# Patient Record
Sex: FEMALE | Race: WHITE | NOT HISPANIC OR LATINO
[De-identification: names, ages, dates, MRNs, and addresses within clinical notes are randomized per-mention and may not be internally consistent; named-entity substitution may affect disease eponyms.]

---

## 2017-05-09 ENCOUNTER — APPOINTMENT (OUTPATIENT)
Dept: ELECTROPHYSIOLOGY | Facility: CLINIC | Age: 67
End: 2017-05-09

## 2017-05-09 VITALS
BODY MASS INDEX: 17.93 KG/M2 | HEIGHT: 64 IN | HEART RATE: 60 BPM | DIASTOLIC BLOOD PRESSURE: 82 MMHG | WEIGHT: 105 LBS | SYSTOLIC BLOOD PRESSURE: 140 MMHG

## 2017-10-13 ENCOUNTER — RX RENEWAL (OUTPATIENT)
Age: 67
End: 2017-10-13

## 2017-12-13 ENCOUNTER — APPOINTMENT (OUTPATIENT)
Dept: ELECTROPHYSIOLOGY | Facility: CLINIC | Age: 67
End: 2017-12-13
Payer: SELF-PAY

## 2017-12-13 VITALS
SYSTOLIC BLOOD PRESSURE: 152 MMHG | DIASTOLIC BLOOD PRESSURE: 88 MMHG | HEIGHT: 64 IN | HEART RATE: 63 BPM | WEIGHT: 105 LBS | BODY MASS INDEX: 17.93 KG/M2

## 2017-12-13 PROCEDURE — 93283 PRGRMG EVAL IMPLANTABLE DFB: CPT

## 2018-05-31 ENCOUNTER — APPOINTMENT (OUTPATIENT)
Dept: ELECTROPHYSIOLOGY | Facility: CLINIC | Age: 68
End: 2018-05-31
Payer: MEDICARE

## 2018-05-31 VITALS
DIASTOLIC BLOOD PRESSURE: 82 MMHG | SYSTOLIC BLOOD PRESSURE: 147 MMHG | BODY MASS INDEX: 17.93 KG/M2 | HEART RATE: 63 BPM | HEIGHT: 64 IN | WEIGHT: 105 LBS

## 2018-05-31 PROCEDURE — 93283 PRGRMG EVAL IMPLANTABLE DFB: CPT

## 2018-06-06 ENCOUNTER — OUTPATIENT (OUTPATIENT)
Dept: OUTPATIENT SERVICES | Facility: HOSPITAL | Age: 68
LOS: 1 days | Discharge: HOME | End: 2018-06-06
Payer: MEDICARE

## 2018-06-06 DIAGNOSIS — Z95.810 PRESENCE OF AUTOMATIC (IMPLANTABLE) CARDIAC DEFIBRILLATOR: Chronic | ICD-10-CM

## 2018-06-06 DIAGNOSIS — I42.1 OBSTRUCTIVE HYPERTROPHIC CARDIOMYOPATHY: ICD-10-CM

## 2018-06-06 DIAGNOSIS — I44.2 ATRIOVENTRICULAR BLOCK, COMPLETE: ICD-10-CM

## 2018-06-06 PROCEDURE — 93306 TTE W/DOPPLER COMPLETE: CPT | Mod: 26

## 2018-07-11 ENCOUNTER — APPOINTMENT (OUTPATIENT)
Dept: ELECTROPHYSIOLOGY | Facility: CLINIC | Age: 68
End: 2018-07-11
Payer: MEDICARE

## 2018-07-11 VITALS
HEART RATE: 72 BPM | SYSTOLIC BLOOD PRESSURE: 160 MMHG | HEIGHT: 64 IN | DIASTOLIC BLOOD PRESSURE: 87 MMHG | WEIGHT: 105 LBS | BODY MASS INDEX: 17.93 KG/M2

## 2018-07-11 PROCEDURE — 93283 PRGRMG EVAL IMPLANTABLE DFB: CPT

## 2018-07-11 PROCEDURE — 99215 OFFICE O/P EST HI 40 MIN: CPT

## 2018-07-16 ENCOUNTER — EMERGENCY (EMERGENCY)
Facility: HOSPITAL | Age: 68
LOS: 0 days | Discharge: HOME | End: 2018-07-16
Attending: EMERGENCY MEDICINE | Admitting: EMERGENCY MEDICINE
Payer: MEDICARE

## 2018-07-16 VITALS
DIASTOLIC BLOOD PRESSURE: 75 MMHG | OXYGEN SATURATION: 100 % | TEMPERATURE: 98 F | HEART RATE: 69 BPM | RESPIRATION RATE: 17 BRPM | SYSTOLIC BLOOD PRESSURE: 154 MMHG

## 2018-07-16 VITALS — WEIGHT: 115.08 LBS | HEIGHT: 64 IN

## 2018-07-16 DIAGNOSIS — Z95.810 PRESENCE OF AUTOMATIC (IMPLANTABLE) CARDIAC DEFIBRILLATOR: Chronic | ICD-10-CM

## 2018-07-16 DIAGNOSIS — K43.9 VENTRAL HERNIA WITHOUT OBSTRUCTION OR GANGRENE: ICD-10-CM

## 2018-07-16 LAB
ALBUMIN SERPL ELPH-MCNC: 4.1 G/DL — SIGNIFICANT CHANGE UP (ref 3.3–5)
ALP SERPL-CCNC: 118 U/L — SIGNIFICANT CHANGE UP (ref 40–120)
ALT FLD-CCNC: 30 U/L — SIGNIFICANT CHANGE UP (ref 12–78)
ANION GAP SERPL CALC-SCNC: 4 MMOL/L — LOW (ref 5–17)
AST SERPL-CCNC: 20 U/L — SIGNIFICANT CHANGE UP (ref 15–37)
BASOPHILS # BLD AUTO: 0.02 K/UL — SIGNIFICANT CHANGE UP (ref 0–0.2)
BASOPHILS NFR BLD AUTO: 0.4 % — SIGNIFICANT CHANGE UP (ref 0–2)
BILIRUB SERPL-MCNC: 0.5 MG/DL — SIGNIFICANT CHANGE UP (ref 0.2–1.2)
BUN SERPL-MCNC: 14 MG/DL — SIGNIFICANT CHANGE UP (ref 7–23)
CALCIUM SERPL-MCNC: 9.4 MG/DL — SIGNIFICANT CHANGE UP (ref 8.5–10.1)
CHLORIDE SERPL-SCNC: 104 MMOL/L — SIGNIFICANT CHANGE UP (ref 96–108)
CO2 SERPL-SCNC: 30 MMOL/L — SIGNIFICANT CHANGE UP (ref 22–31)
CREAT SERPL-MCNC: 0.91 MG/DL — SIGNIFICANT CHANGE UP (ref 0.5–1.3)
EOSINOPHIL # BLD AUTO: 0.07 K/UL — SIGNIFICANT CHANGE UP (ref 0–0.5)
EOSINOPHIL NFR BLD AUTO: 1.3 % — SIGNIFICANT CHANGE UP (ref 0–6)
GLUCOSE SERPL-MCNC: 96 MG/DL — SIGNIFICANT CHANGE UP (ref 70–99)
HCT VFR BLD CALC: 44.7 % — SIGNIFICANT CHANGE UP (ref 34.5–45)
HGB BLD-MCNC: 14.8 G/DL — SIGNIFICANT CHANGE UP (ref 11.5–15.5)
IMM GRANULOCYTES NFR BLD AUTO: 0.2 % — SIGNIFICANT CHANGE UP (ref 0–1.5)
INR BLD: 1.08 RATIO — SIGNIFICANT CHANGE UP (ref 0.88–1.16)
LACTATE SERPL-SCNC: 1.5 MMOL/L — SIGNIFICANT CHANGE UP (ref 0.7–2)
LYMPHOCYTES # BLD AUTO: 1.51 K/UL — SIGNIFICANT CHANGE UP (ref 1–3.3)
LYMPHOCYTES # BLD AUTO: 27.5 % — SIGNIFICANT CHANGE UP (ref 13–44)
MCHC RBC-ENTMCNC: 30.9 PG — SIGNIFICANT CHANGE UP (ref 27–34)
MCHC RBC-ENTMCNC: 33.1 GM/DL — SIGNIFICANT CHANGE UP (ref 32–36)
MCV RBC AUTO: 93.3 FL — SIGNIFICANT CHANGE UP (ref 80–100)
MONOCYTES # BLD AUTO: 0.7 K/UL — SIGNIFICANT CHANGE UP (ref 0–0.9)
MONOCYTES NFR BLD AUTO: 12.7 % — SIGNIFICANT CHANGE UP (ref 2–14)
NEUTROPHILS # BLD AUTO: 3.19 K/UL — SIGNIFICANT CHANGE UP (ref 1.8–7.4)
NEUTROPHILS NFR BLD AUTO: 57.9 % — SIGNIFICANT CHANGE UP (ref 43–77)
NRBC # BLD: 0 /100 WBCS — SIGNIFICANT CHANGE UP (ref 0–0)
PLATELET # BLD AUTO: 223 K/UL — SIGNIFICANT CHANGE UP (ref 150–400)
POTASSIUM SERPL-MCNC: 4.5 MMOL/L — SIGNIFICANT CHANGE UP (ref 3.5–5.3)
POTASSIUM SERPL-SCNC: 4.5 MMOL/L — SIGNIFICANT CHANGE UP (ref 3.5–5.3)
PROT SERPL-MCNC: 8 GM/DL — SIGNIFICANT CHANGE UP (ref 6–8.3)
PROTHROM AB SERPL-ACNC: 11.7 SEC — SIGNIFICANT CHANGE UP (ref 9.8–12.7)
RBC # BLD: 4.79 M/UL — SIGNIFICANT CHANGE UP (ref 3.8–5.2)
RBC # FLD: 12.9 % — SIGNIFICANT CHANGE UP (ref 10.3–14.5)
SODIUM SERPL-SCNC: 138 MMOL/L — SIGNIFICANT CHANGE UP (ref 135–145)
WBC # BLD: 5.5 K/UL — SIGNIFICANT CHANGE UP (ref 3.8–10.5)
WBC # FLD AUTO: 5.5 K/UL — SIGNIFICANT CHANGE UP (ref 3.8–10.5)

## 2018-07-16 PROCEDURE — 99284 EMERGENCY DEPT VISIT MOD MDM: CPT

## 2018-07-16 PROCEDURE — 74177 CT ABD & PELVIS W/CONTRAST: CPT | Mod: 26

## 2018-07-16 NOTE — ED STATDOCS - GASTROINTESTINAL, MLM
Abd non-distended. Bowel sounds present. Ventral hernia hard, non-reducible. No erythema. Mild tenderness to palpation.

## 2018-07-16 NOTE — CONSULT NOTE ADULT - ASSESSMENT
66 yo fem with epigastric small size hernia with preperitoneal fat inside hernia, no SBO  -She can go home and have hernia repair done electively if hernia continue bothering her. Patient needs to travel abroad in 3 [place], she said  -Minimize heavy lifting  -Case d/w PA

## 2018-07-16 NOTE — ED STATDOCS - PROGRESS NOTE DETAILS
68 yo F with no significant PMHx presents to ED c/o hernia.  Pt reports hernia popped out while exercising on Wednesday and has not gone back into place since but has gotten smaller, pt states hernia is only painful when pressed, denies N/V/D/C or other acute complaints at this time  PE: L sided ventral hernia mildly TTP, hard, non-reducible, w/o erythema, lungs CTA b/l, heart RRR s1/s2  Plan: CT a/p, labs, surgery consult  Grace Beckett PA-C spoke to surgeon on call Dr. Alarcon, states he is in the OR now and will see the pt when he is out of surgery  Grace Beckett PA-C CT A/P showed Fat-containing epigastric hernia within inflammatory changes.  No bowel obstruction. All labs WNL.  Pt seen by surgery Dr. Alarcon who advised pt can f/u as outpt to have hernia repaired electively.  Plan to d/c home with outpt f/u.  Pt agreeable to d/c and plan of care, all questions answered, return precautions  given.  Grace Beckett PA-C

## 2018-07-16 NOTE — CONSULT NOTE ADULT - SUBJECTIVE AND OBJECTIVE BOX
68 yo fem with h/o upper abdominal lump for many years, has been straining, lifting weight when she noticed upper abdominal lump got a bit bigger, she was able to reduce some of it, no vomiting. CT showed pre-peritoneal fat inside an epigastric hernia.    HPI:      PAST MEDICAL & SURGICAL HISTORY:  No previous surgeries, no PMH    REVIEW OF SYSTEMS:    CONSTITUTIONAL: No weakness, fevers or chills  EYES/ENT: No visual changes;  No vertigo or throat pain   NECK: No pain or stiffness  RESPIRATORY: No cough, wheezing, hemoptysis; No shortness of breath  CARDIOVASCULAR: No chest pain or palpitations  GASTROINTESTINAL: No abdominal or epigastric pain. No nausea, vomiting, or hematemesis; No diarrhea or constipation. No melena or hematochezia.  GENITOURINARY: No dysuria, frequency or hematuria  NEUROLOGICAL: No numbness or weakness  SKIN: No itching, burning, rashes, or lesions   All other review of systems is negative unless indicated above.    MEDICATIONS  (STANDING):    MEDICATIONS  (PRN):      Allergies    No Known Allergies    Intolerances        SOCIAL HISTORY: denies smoking    FAMILY HISTORY: non contributory      Vital Signs Last 24 Hrs  T(C): 36.5 (16 Jul 2018 11:45), Max: 36.5 (16 Jul 2018 11:45)  T(F): 97.7 (16 Jul 2018 11:45), Max: 97.7 (16 Jul 2018 11:45)  HR: 65 (16 Jul 2018 11:45) (65 - 65)  BP: 148/84 (16 Jul 2018 11:45) (148/84 - 148/84)  BP(mean): --  RR: 18 (16 Jul 2018 11:45) (18 - 18)  SpO2: 98% (16 Jul 2018 11:45) (98% - 98%)    .  VITAL SIGNS:  T(C): 36.5 (07-16-18 @ 11:45), Max: 36.5 (07-16-18 @ 11:45)  T(F): 97.7 (07-16-18 @ 11:45), Max: 97.7 (07-16-18 @ 11:45)  HR: 65 (07-16-18 @ 11:45) (65 - 65)  BP: 148/84 (07-16-18 @ 11:45) (148/84 - 148/84)  BP(mean): --  RR: 18 (07-16-18 @ 11:45) (18 - 18)  SpO2: 98% (07-16-18 @ 11:45) (98% - 98%)  Wt(kg): --    PHYSICAL EXAM:    Constitutional: resting comfortably in bed; NAD  Eyes: PERRL, EOMI, anicteric sclera  ENT: no nasal discharge; uvula midline, no oropharyngeal erythema or exudates  Neck: supple; no JVD or thyromegaly  Respiratory: CTA B/L; no W/R/R, no retractions  Cardiac: +S1/S2; RRR; no murmurs  Gastrointestinal: soft, minimally tender epigastric 2cm  hernia sac, rest of abdomen normal  Back: spine midline, no bony tenderness or step-offs; no CVAT B/L  Extremities: no clubbing or cyanosis; no peripheral edema  Musculoskeletal: NROM x4; no joint swelling, tenderness or erythema  Vascular: 2+ radial, femoral, DP/PT pulses B/L  Dermatologic: skin warm, dry and intact; no rashes, wounds, or scars  Lymphatic: no submandibular or cervical LAD  Neurologic: AAOx3; CNII-XII grossly intact; no focal deficits  Psychiatric: affect and characteristics of appearance, verbalizations, behaviors are appropriate    LABS:                        14.8   5.50  )-----------( 223      ( 16 Jul 2018 12:23 )             44.7       07-16    138  |  104  |  14  ----------------------------<  96  4.5   |  30  |  0.91    Ca    9.4      16 Jul 2018 12:23    TPro  8.0  /  Alb  4.1  /  TBili  0.5  /  DBili  x   /  AST  20  /  ALT  30  /  AlkPhos  118  07-16      PT/INR - ( 16 Jul 2018 12:23 )   PT: 11.7 sec;   INR: 1.08 ratio                 RADIOLOGY & ADDITIONAL STUDIES:

## 2018-07-16 NOTE — ED STATDOCS - OBJECTIVE STATEMENT
66 y/o F with PMHx of presenting to the ED c/o ventral hernia. Reports that she has a hernia which popped out five days ago while exercising. Notes that hernia has not gone back into place but has "gotten smaller" in size. No N/V/D. No other complaints.

## 2018-07-16 NOTE — ED ADULT NURSE NOTE - OBJECTIVE STATEMENT
Pt reports always having a small "bump" or "bulge" in her abd that did not appear to change. Pt reports that she was doing a new exercise where she raised a weight over her head and felt a pain in that area. Pt reports that the area is sensitive to touch and has some discomfort with similar motion of straining weight over her head.

## 2018-07-16 NOTE — ED ADULT TRIAGE NOTE - CHIEF COMPLAINT QUOTE
Sent in for CT of abdomen. Patient reports she has a "hernia", noted to be bigger since Wednesday after working out. Denies N/V/D, or constipation, reports "bulge" is getting smaller.

## 2018-07-16 NOTE — ED STATDOCS - MEDICAL DECISION MAKING DETAILS
. Ventral hernia reduced by Dr. Alarcon from surgery.  CT abdomen unremarkable.  Okay for d/c home, f/u with surgery as outpatient.

## 2018-08-23 ENCOUNTER — MEDICATION RENEWAL (OUTPATIENT)
Age: 68
End: 2018-08-23

## 2018-11-26 ENCOUNTER — RX RENEWAL (OUTPATIENT)
Age: 68
End: 2018-11-26

## 2018-12-04 NOTE — ED ADULT NURSE NOTE - NS ED PATIENT SAFETY CONCERN
Telephone Encounter by Mira Morrison at 05/01/18 02:30 PM     Author:  Mira Morrison Service:  (none) Author Type:  Patient      Filed:  05/01/18 02:32 PM Encounter Date:  4/26/2018 Status:  Signed     :  Mira Morrison (Patient )            Received signed release forms via fax.  They do not have address and phone for therapist and neurologist.  Left message for parents to call back with information since there are multiple listing on line and not sure which one to send to.[RR1.1M]        Revision History        User Key Date/Time User Provider Type Action    > RR1.1 05/01/18 02:32 PM Mira Morrison Patient  Sign    M - Manual             No

## 2019-02-15 ENCOUNTER — APPOINTMENT (OUTPATIENT)
Dept: ELECTROPHYSIOLOGY | Facility: CLINIC | Age: 69
End: 2019-02-15
Payer: MEDICARE

## 2019-02-15 VITALS — BODY MASS INDEX: 17.93 KG/M2 | HEIGHT: 64 IN | WEIGHT: 105 LBS

## 2019-02-15 VITALS
WEIGHT: 105 LBS | BODY MASS INDEX: 17.93 KG/M2 | HEART RATE: 73 BPM | HEIGHT: 64 IN | SYSTOLIC BLOOD PRESSURE: 158 MMHG | DIASTOLIC BLOOD PRESSURE: 97 MMHG

## 2019-02-15 PROCEDURE — 93283 PRGRMG EVAL IMPLANTABLE DFB: CPT

## 2019-02-15 PROCEDURE — 93290 INTERROG DEV EVAL ICPMS IP: CPT | Mod: 26

## 2019-02-28 ENCOUNTER — OUTPATIENT (OUTPATIENT)
Dept: OUTPATIENT SERVICES | Facility: HOSPITAL | Age: 69
LOS: 1 days | Discharge: HOME | End: 2019-02-28
Payer: MEDICARE

## 2019-02-28 VITALS
HEART RATE: 60 BPM | OXYGEN SATURATION: 100 % | HEIGHT: 64 IN | DIASTOLIC BLOOD PRESSURE: 74 MMHG | RESPIRATION RATE: 20 BRPM | WEIGHT: 110.01 LBS | TEMPERATURE: 98 F | SYSTOLIC BLOOD PRESSURE: 155 MMHG

## 2019-02-28 DIAGNOSIS — Z95.810 PRESENCE OF AUTOMATIC (IMPLANTABLE) CARDIAC DEFIBRILLATOR: Chronic | ICD-10-CM

## 2019-02-28 PROCEDURE — 99212 OFFICE O/P EST SF 10 MIN: CPT

## 2019-02-28 NOTE — H&P CARDIOLOGY - COMMENTS
Pt was successfully re-programmed as Hartman has advised.  Pt will f/u with Dr. Guerrero in his office

## 2019-03-08 DIAGNOSIS — I44.1 ATRIOVENTRICULAR BLOCK, SECOND DEGREE: ICD-10-CM

## 2019-05-31 ENCOUNTER — APPOINTMENT (OUTPATIENT)
Dept: ELECTROPHYSIOLOGY | Facility: CLINIC | Age: 69
End: 2019-05-31

## 2019-06-11 PROBLEM — I42.1 OBSTRUCTIVE HYPERTROPHIC CARDIOMYOPATHY: Chronic | Status: ACTIVE | Noted: 2019-02-28

## 2019-08-14 ENCOUNTER — APPOINTMENT (OUTPATIENT)
Dept: ELECTROPHYSIOLOGY | Facility: CLINIC | Age: 69
End: 2019-08-14
Payer: MEDICARE

## 2019-08-14 VITALS — HEART RATE: 67 BPM | DIASTOLIC BLOOD PRESSURE: 85 MMHG | SYSTOLIC BLOOD PRESSURE: 150 MMHG | HEIGHT: 64 IN

## 2019-08-14 PROCEDURE — 99213 OFFICE O/P EST LOW 20 MIN: CPT

## 2019-08-14 PROCEDURE — 93283 PRGRMG EVAL IMPLANTABLE DFB: CPT

## 2019-08-18 NOTE — DISCUSSION/SUMMARY
[AICD Function Normal] : normal AICD function [Paroxysmal Atrial Fibrillation] : paroxysmal atrial fibrillation [Hypertrophic Cardiomyopathy] : hypertrophic cardiomyopathy [Stable] : stable [None] : none [Patient] : the patient [de-identified] : continue ICD monitoring.

## 2019-08-18 NOTE — PROCEDURE
[NSR] : normal sinus rhythm [See Scanned Paceart Report] : See scanned paceart report [See Device Printout] : See device printout [ICD] : Implantable cardioverter-defibrillator [DDDR] : DDDR [Lead Imp:  ___ohms] : lead impedance was [unfilled] ohms [Sensing Amplitude ___mv] : sensing amplitude was [unfilled] mv [___V @] : [unfilled] V [___ ms] : [unfilled] ms [None] : none [Counters Reset] : the counters were reset [de-identified] : Fort[employer] DR 4808-72M [de-identified] : [employer] [de-identified] : 60/120

## 2019-08-18 NOTE — ASSESSMENT
[FreeTextEntry1] : This is a 67 year old woman with apical hypertrophic carediomyopathy moderate valvular heart disease whois physically active without significant symptoms. Interrogation of ICD demonstrates recent onset of brief (longest 12 sec)  paroxysmal atrial fibrillation.

## 2019-08-18 NOTE — REASON FOR VISIT
[Follow-up Device Check] : follow-up device check visit [Follow-Up - Clinic] : a clinic follow-up of [Atrial Fibrillation] : atrial fibrillation [Cardiomyopathy] : cardiomyopathy [FreeTextEntry1] : [facility]

## 2019-08-18 NOTE — PHYSICAL EXAM
[General Appearance - Well Developed] : well developed [Well Groomed] : well groomed [Normal Appearance] : normal appearance [No Deformities] : no deformities [General Appearance - Well Nourished] : well nourished [General Appearance - In No Acute Distress] : no acute distress [Respiration, Rhythm And Depth] : normal respiratory rhythm and effort [Exaggerated Use Of Accessory Muscles For Inspiration] : no accessory muscle use [Clean] : clean [Auscultation Breath Sounds / Voice Sounds] : lungs were clear to auscultation bilaterally [Dry] : dry [Well-Healed] : well-healed [Abdomen Tenderness] : non-tender [Abdomen Soft] : soft [Abdomen Mass (___ Cm)] : no abdominal mass palpated [Nail Clubbing] : no clubbing of the fingernails [Cyanosis, Localized] : no localized cyanosis [Petechial Hemorrhages (___cm)] : no petechial hemorrhages [Normal Conjunctiva] : the conjunctiva exhibited no abnormalities [Normal Oral Mucosa] : normal oral mucosa [Eyelids - No Xanthelasma] : the eyelids demonstrated no xanthelasmas [No Oral Cyanosis] : no oral cyanosis [No Oral Pallor] : no oral pallor [Normal Jugular Venous A Waves Present] : normal jugular venous A waves present [Normal Jugular Venous V Waves Present] : normal jugular venous V waves present [5th Left ICS - MCL] : palpated at the 5th LICS in the midclavicular line [No Jugular Venous Shaffer A Waves] : no jugular venous shaffer A waves [Normal Rate] : normal [Normal] : normal [Normal S1] : normal S1 [Rhythm Regular] : regular [Normal S2] : normal S2 [II] : a grade 2 [2+] : left 2+ [No Pitting Edema] : no pitting edema present [Rt] : no varicose veins of the right leg [Lt] : no varicose veins of the left leg [Gait - Sufficient For Exercise Testing] : the gait was sufficient for exercise testing [Abnormal Walk] : normal gait [Skin Color & Pigmentation] : normal skin color and pigmentation [] : no rash [No Venous Stasis] : no venous stasis [No Skin Ulcers] : no skin ulcer [No Xanthoma] : no  xanthoma was observed [Skin Lesions] : no skin lesions [Oriented To Time, Place, And Person] : oriented to person, place, and time [Affect] : the affect was normal [No Anxiety] : not feeling anxious [Mood] : the mood was normal

## 2019-08-18 NOTE — CARDIOLOGY SUMMARY
[___] : [unfilled] [LVEF ___%] : LVEF [unfilled]% [None] : normal LV function [Mild] : mild pulmonary hypertension [Enlarged] : enlarged LA size [Severe] : severe mitral regurgitation

## 2019-08-18 NOTE — HISTORY OF PRESENT ILLNESS
[FreeTextEntry1] : TIFFANIE FADI  denies chest pain, chest pressure, shortness of breath, lightheadedness, dizziness, palpitations, syncope, presyncope, orthopnea, PND, or edema.  [None] : The patient complains of no symptoms

## 2019-11-07 ENCOUNTER — APPOINTMENT (OUTPATIENT)
Dept: ELECTROPHYSIOLOGY | Facility: CLINIC | Age: 69
End: 2019-11-07

## 2019-11-07 RX ORDER — UBIDECARENONE 100 MG
100 CAPSULE ORAL
Refills: 0 | Status: ACTIVE | COMMUNITY

## 2020-02-04 ENCOUNTER — APPOINTMENT (OUTPATIENT)
Dept: ELECTROPHYSIOLOGY | Facility: CLINIC | Age: 70
End: 2020-02-04
Payer: MEDICARE

## 2020-02-04 VITALS
DIASTOLIC BLOOD PRESSURE: 91 MMHG | HEART RATE: 76 BPM | BODY MASS INDEX: 20.49 KG/M2 | SYSTOLIC BLOOD PRESSURE: 161 MMHG | HEIGHT: 64 IN | WEIGHT: 120 LBS | OXYGEN SATURATION: 97 %

## 2020-02-04 PROCEDURE — 93283 PRGRMG EVAL IMPLANTABLE DFB: CPT

## 2020-06-25 ENCOUNTER — APPOINTMENT (OUTPATIENT)
Dept: ELECTROPHYSIOLOGY | Facility: CLINIC | Age: 70
End: 2020-06-25
Payer: MEDICARE

## 2020-06-25 VITALS
BODY MASS INDEX: 20.49 KG/M2 | SYSTOLIC BLOOD PRESSURE: 156 MMHG | WEIGHT: 120 LBS | DIASTOLIC BLOOD PRESSURE: 95 MMHG | HEART RATE: 60 BPM | HEIGHT: 64 IN | OXYGEN SATURATION: 100 %

## 2020-06-25 PROCEDURE — 93283 PRGRMG EVAL IMPLANTABLE DFB: CPT

## 2020-07-13 ENCOUNTER — OUTPATIENT (OUTPATIENT)
Dept: OUTPATIENT SERVICES | Facility: HOSPITAL | Age: 70
LOS: 1 days | End: 2020-07-13

## 2020-07-13 ENCOUNTER — RESULT REVIEW (OUTPATIENT)
Age: 70
End: 2020-07-13

## 2020-07-13 ENCOUNTER — OUTPATIENT (OUTPATIENT)
Dept: OUTPATIENT SERVICES | Facility: HOSPITAL | Age: 70
LOS: 1 days | End: 2020-07-13
Payer: MEDICARE

## 2020-07-13 VITALS
DIASTOLIC BLOOD PRESSURE: 76 MMHG | SYSTOLIC BLOOD PRESSURE: 161 MMHG | WEIGHT: 119.05 LBS | HEIGHT: 64 IN | RESPIRATION RATE: 16 BRPM | HEART RATE: 60 BPM | TEMPERATURE: 98 F | OXYGEN SATURATION: 99 %

## 2020-07-13 DIAGNOSIS — I44.2 ATRIOVENTRICULAR BLOCK, COMPLETE: ICD-10-CM

## 2020-07-13 DIAGNOSIS — Z01.818 ENCOUNTER FOR OTHER PREPROCEDURAL EXAMINATION: ICD-10-CM

## 2020-07-13 DIAGNOSIS — Z95.810 PRESENCE OF AUTOMATIC (IMPLANTABLE) CARDIAC DEFIBRILLATOR: Chronic | ICD-10-CM

## 2020-07-13 LAB
ANION GAP SERPL CALC-SCNC: 3 MMOL/L — LOW (ref 5–17)
BASOPHILS # BLD AUTO: 0.01 K/UL — SIGNIFICANT CHANGE UP (ref 0–0.2)
BASOPHILS NFR BLD AUTO: 0.2 % — SIGNIFICANT CHANGE UP (ref 0–2)
BUN SERPL-MCNC: 13 MG/DL — SIGNIFICANT CHANGE UP (ref 7–23)
CALCIUM SERPL-MCNC: 9.4 MG/DL — SIGNIFICANT CHANGE UP (ref 8.5–10.1)
CHLORIDE SERPL-SCNC: 109 MMOL/L — HIGH (ref 96–108)
CO2 SERPL-SCNC: 30 MMOL/L — SIGNIFICANT CHANGE UP (ref 22–31)
CREAT SERPL-MCNC: 0.78 MG/DL — SIGNIFICANT CHANGE UP (ref 0.5–1.3)
EOSINOPHIL # BLD AUTO: 0.01 K/UL — SIGNIFICANT CHANGE UP (ref 0–0.5)
EOSINOPHIL NFR BLD AUTO: 0.2 % — SIGNIFICANT CHANGE UP (ref 0–6)
GLUCOSE SERPL-MCNC: 92 MG/DL — SIGNIFICANT CHANGE UP (ref 70–99)
HCT VFR BLD CALC: 42.1 % — SIGNIFICANT CHANGE UP (ref 34.5–45)
HGB BLD-MCNC: 14.1 G/DL — SIGNIFICANT CHANGE UP (ref 11.5–15.5)
IMM GRANULOCYTES NFR BLD AUTO: 0.2 % — SIGNIFICANT CHANGE UP (ref 0–1.5)
LYMPHOCYTES # BLD AUTO: 1.21 K/UL — SIGNIFICANT CHANGE UP (ref 1–3.3)
LYMPHOCYTES # BLD AUTO: 23 % — SIGNIFICANT CHANGE UP (ref 13–44)
MCHC RBC-ENTMCNC: 31.2 PG — SIGNIFICANT CHANGE UP (ref 27–34)
MCHC RBC-ENTMCNC: 33.5 GM/DL — SIGNIFICANT CHANGE UP (ref 32–36)
MCV RBC AUTO: 93.1 FL — SIGNIFICANT CHANGE UP (ref 80–100)
MONOCYTES # BLD AUTO: 0.51 K/UL — SIGNIFICANT CHANGE UP (ref 0–0.9)
MONOCYTES NFR BLD AUTO: 9.7 % — SIGNIFICANT CHANGE UP (ref 2–14)
NEUTROPHILS # BLD AUTO: 3.51 K/UL — SIGNIFICANT CHANGE UP (ref 1.8–7.4)
NEUTROPHILS NFR BLD AUTO: 66.7 % — SIGNIFICANT CHANGE UP (ref 43–77)
PLATELET # BLD AUTO: 187 K/UL — SIGNIFICANT CHANGE UP (ref 150–400)
POTASSIUM SERPL-MCNC: 4 MMOL/L — SIGNIFICANT CHANGE UP (ref 3.5–5.3)
POTASSIUM SERPL-SCNC: 4 MMOL/L — SIGNIFICANT CHANGE UP (ref 3.5–5.3)
RBC # BLD: 4.52 M/UL — SIGNIFICANT CHANGE UP (ref 3.8–5.2)
RBC # FLD: 12.9 % — SIGNIFICANT CHANGE UP (ref 10.3–14.5)
SODIUM SERPL-SCNC: 142 MMOL/L — SIGNIFICANT CHANGE UP (ref 135–145)
WBC # BLD: 5.26 K/UL — SIGNIFICANT CHANGE UP (ref 3.8–10.5)
WBC # FLD AUTO: 5.26 K/UL — SIGNIFICANT CHANGE UP (ref 3.8–10.5)

## 2020-07-13 PROCEDURE — U0003: CPT

## 2020-07-13 PROCEDURE — 71046 X-RAY EXAM CHEST 2 VIEWS: CPT

## 2020-07-13 PROCEDURE — 93005 ELECTROCARDIOGRAM TRACING: CPT

## 2020-07-13 PROCEDURE — 71046 X-RAY EXAM CHEST 2 VIEWS: CPT | Mod: 26

## 2020-07-13 PROCEDURE — G0463: CPT | Mod: 25

## 2020-07-13 PROCEDURE — 87641 MR-STAPH DNA AMP PROBE: CPT

## 2020-07-13 PROCEDURE — 93010 ELECTROCARDIOGRAM REPORT: CPT

## 2020-07-13 PROCEDURE — 93306 TTE W/DOPPLER COMPLETE: CPT | Mod: 26

## 2020-07-13 PROCEDURE — 80048 BASIC METABOLIC PNL TOTAL CA: CPT

## 2020-07-13 PROCEDURE — 36415 COLL VENOUS BLD VENIPUNCTURE: CPT

## 2020-07-13 PROCEDURE — 85025 COMPLETE CBC W/AUTO DIFF WBC: CPT

## 2020-07-13 PROCEDURE — 87640 STAPH A DNA AMP PROBE: CPT

## 2020-07-13 NOTE — H&P PST ADULT - HISTORY OF PRESENT ILLNESS
69 year old female with cardiomyopathy Pt ICD at end of life ; she presents to PST for planned ICD generator change

## 2020-07-13 NOTE — H&P PST ADULT - NSANTHOSAYNRD_GEN_A_CORE
No. AMARILYS screening performed.  STOP BANG Legend: 0-2 = LOW Risk; 3-4 = INTERMEDIATE Risk; 5-8 = HIGH Risk

## 2020-07-13 NOTE — H&P PST ADULT - ASSESSMENT
69 year old female she presents to PST for planned ICD generator change   1.  Dr Pena  office  will instruct patient regarding  medication regimen on day of procedure.  2. EPS  booking will call patient the day before surgery for arrival instructions   3. NPO post midnight  4. CBC BMP EKG MRSA CXR  as per Dr Pena   5. Patient instructed to have responsible adult accompany/ drive pt home after procedure

## 2020-07-13 NOTE — H&P PST ADULT - NSICDXPASTMEDICALHX_GEN_ALL_CORE_FT
PAST MEDICAL HISTORY:  Anxiety     Cardiomyopathy, hypertrophic obstructive     Complete heart block     Epigastric hernia     Pemphigus foliaceus     Pulmonary hypertension, mild ECHO 2018    Tricuspid regurgitation

## 2020-07-14 DIAGNOSIS — I44.2 ATRIOVENTRICULAR BLOCK, COMPLETE: ICD-10-CM

## 2020-07-14 DIAGNOSIS — Z01.818 ENCOUNTER FOR OTHER PREPROCEDURAL EXAMINATION: ICD-10-CM

## 2020-07-14 LAB
MRSA PCR RESULT.: SIGNIFICANT CHANGE UP
S AUREUS DNA NOSE QL NAA+PROBE: SIGNIFICANT CHANGE UP
SARS-COV-2 RNA SPEC QL NAA+PROBE: SIGNIFICANT CHANGE UP

## 2020-07-15 ENCOUNTER — OUTPATIENT (OUTPATIENT)
Dept: OUTPATIENT SERVICES | Facility: HOSPITAL | Age: 70
LOS: 1 days | Discharge: HOME | End: 2020-07-15
Payer: MEDICARE

## 2020-07-15 VITALS
HEIGHT: 64 IN | OXYGEN SATURATION: 100 % | HEART RATE: 60 BPM | SYSTOLIC BLOOD PRESSURE: 159 MMHG | WEIGHT: 115.96 LBS | RESPIRATION RATE: 18 BRPM | DIASTOLIC BLOOD PRESSURE: 95 MMHG | TEMPERATURE: 98 F

## 2020-07-15 DIAGNOSIS — I07.1 RHEUMATIC TRICUSPID INSUFFICIENCY: ICD-10-CM

## 2020-07-15 DIAGNOSIS — I42.1 OBSTRUCTIVE HYPERTROPHIC CARDIOMYOPATHY: ICD-10-CM

## 2020-07-15 DIAGNOSIS — I50.22 CHRONIC SYSTOLIC (CONGESTIVE) HEART FAILURE: ICD-10-CM

## 2020-07-15 DIAGNOSIS — I44.2 ATRIOVENTRICULAR BLOCK, COMPLETE: ICD-10-CM

## 2020-07-15 DIAGNOSIS — Z45.02 ENCOUNTER FOR ADJUSTMENT AND MANAGEMENT OF AUTOMATIC IMPLANTABLE CARDIAC DEFIBRILLATOR: ICD-10-CM

## 2020-07-15 DIAGNOSIS — Z95.810 PRESENCE OF AUTOMATIC (IMPLANTABLE) CARDIAC DEFIBRILLATOR: ICD-10-CM

## 2020-07-15 DIAGNOSIS — Z95.810 PRESENCE OF AUTOMATIC (IMPLANTABLE) CARDIAC DEFIBRILLATOR: Chronic | ICD-10-CM

## 2020-07-15 DIAGNOSIS — I27.20 PULMONARY HYPERTENSION, UNSPECIFIED: ICD-10-CM

## 2020-07-15 PROCEDURE — 93005 ELECTROCARDIOGRAM TRACING: CPT | Mod: XU

## 2020-07-15 PROCEDURE — 33263 RMVL & RPLCMT DFB GEN 2 LEAD: CPT

## 2020-07-15 PROCEDURE — C1721: CPT

## 2020-07-15 RX ORDER — CEPHALEXIN 500 MG
500 CAPSULE ORAL EVERY 6 HOURS
Refills: 0 | Status: DISCONTINUED | OUTPATIENT
Start: 2020-07-15 | End: 2020-07-15

## 2020-07-15 RX ORDER — CEFAZOLIN SODIUM 1 G
2000 VIAL (EA) INJECTION ONCE
Refills: 0 | Status: COMPLETED | OUTPATIENT
Start: 2020-07-15 | End: 2020-07-15

## 2020-07-15 RX ORDER — ASPIRIN/CALCIUM CARB/MAGNESIUM 324 MG
81 TABLET ORAL ONCE
Refills: 0 | Status: COMPLETED | OUTPATIENT
Start: 2020-07-15 | End: 2020-07-15

## 2020-07-15 RX ORDER — ATENOLOL 25 MG/1
25 TABLET ORAL ONCE
Refills: 0 | Status: COMPLETED | OUTPATIENT
Start: 2020-07-15 | End: 2020-07-15

## 2020-07-15 RX ORDER — ACETAMINOPHEN 500 MG
1000 TABLET ORAL ONCE
Refills: 0 | Status: COMPLETED | OUTPATIENT
Start: 2020-07-15 | End: 2020-07-15

## 2020-07-15 RX ORDER — CEFAZOLIN SODIUM 1 G
1000 VIAL (EA) INJECTION EVERY 8 HOURS
Refills: 0 | Status: COMPLETED | OUTPATIENT
Start: 2020-07-15 | End: 2020-07-16

## 2020-07-15 RX ADMIN — Medication 1000 MILLIGRAM(S): at 18:00

## 2020-07-15 RX ADMIN — Medication 400 MILLIGRAM(S): at 12:00

## 2020-07-15 RX ADMIN — Medication 100 MILLIGRAM(S): at 09:46

## 2020-07-15 NOTE — ASU PATIENT PROFILE, ADULT - PMH
Anxiety    Cardiomyopathy, hypertrophic obstructive    Complete heart block    Epigastric hernia    Pemphigus foliaceus    Pulmonary hypertension, mild  ECHO 2018  Tricuspid regurgitation

## 2020-07-15 NOTE — PACU DISCHARGE NOTE - COMMENTS
Report given to talon Kilpatrick RN on [facility].   Pt. placed on portable cardiac telemetry monitor and reading confirmed shirley Root [facility] telemetry monitor tech. Pt. transferred to inpt. room on [facility], 324-1, on cardiac monitor via stretcher accompanied by transport tech.

## 2020-07-16 ENCOUNTER — TRANSCRIPTION ENCOUNTER (OUTPATIENT)
Age: 70
End: 2020-07-16

## 2020-07-16 VITALS
DIASTOLIC BLOOD PRESSURE: 60 MMHG | TEMPERATURE: 98 F | OXYGEN SATURATION: 97 % | HEART RATE: 56 BPM | RESPIRATION RATE: 17 BRPM | SYSTOLIC BLOOD PRESSURE: 138 MMHG

## 2020-07-16 RX ADMIN — Medication 1000 MILLIGRAM(S): at 02:39

## 2020-07-16 RX ADMIN — Medication 1000 MILLIGRAM(S): at 00:09

## 2020-07-16 RX ADMIN — Medication 1000 MILLIGRAM(S): at 00:40

## 2020-07-16 NOTE — DISCHARGE NOTE PROVIDER - HOSPITAL COURSE
69yFemale female who presented for ICD gen change, post-operatively developed a hematoma.  Her wound is stable this am with No drainage, or erythema            TELE:  AS with  at 60 BPM        MEDICATIONS  (STANDING):        MEDICATIONS  (PRN):            Allergies        No Known Allergies        Intolerances        Vital Signs Last 24 Hrs    T(C): 36.7 (16 Jul 2020 05:42), Max: 36.7 (15 Jul 2020 12:25)    T(F): 98 (16 Jul 2020 05:42), Max: 98.1 (15 Jul 2020 12:25)    HR: 55 (16 Jul 2020 05:42) (55 - 63)    BP: 141/62 (16 Jul 2020 05:42) (133/82 - 159/95)    BP(mean): 92 (16 Jul 2020 00:19) (92 - 92)    RR: 16 (16 Jul 2020 00:19) (16 - 19)    SpO2: 97% (16 Jul 2020 05:42) (96% - 100%)        PHYSICAL EXAMINATION:        GENERAL APPEARANCE:  Pt. is not currently dyspneic, in no distress. Pt. is alert, oriented, and pleasant.    HEENT:  Pupils are normal and react normally. No icterus. Mucous membranes well colored.    NECK:  Supple. No lymphadenopathy. Jugular venous pressure not elevated. Carotids equal.     HEART:   The cardiac impulse has a normal quality. There are no murmurs, rubs or gallops noted    CHEST:  Chest is clear to auscultation. Normal respiratory effort, wound with minimal hematoma, no drainage    ABDOMEN:  Soft and nontender.     EXTREMITIES:  There is no edema.     SKIN:  No rash or significant lesions are noted.    Telelmetry:  AS with  and tracking at 60 BPM

## 2020-07-16 NOTE — DISCHARGE NOTE PROVIDER - NSDCCPCAREPLAN_GEN_ALL_CORE_FT
PRINCIPAL DISCHARGE DIAGNOSIS  Diagnosis: Heart failure, systolic, chronic, hypertrophic obstruct cardiomyopathy  Assessment and Plan of Treatment: Low Na diet  F/u with cardiology

## 2020-07-16 NOTE — DISCHARGE NOTE PROVIDER - CARE PROVIDERS DIRECT ADDRESSES
[email or web address]

## 2020-07-16 NOTE — DISCHARGE NOTE PROVIDER - NSDCMRMEDTOKEN_GEN_ALL_CORE_FT
atenolol 25 mg oral tablet: 12.5 milligram(s) orally once a day  CoQ10: 200 milligram(s) orally once a day  Low Dose ASA 81 mg oral tablet: 1 tab(s) orally once a day

## 2020-07-16 NOTE — DISCHARGE NOTE NURSING/CASE MANAGEMENT/SOCIAL WORK - PATIENT PORTAL LINK FT
You can access the FollowMyHealth Patient Portal offered by [facility] by registering at the following website: [email or web address]. By joining [employer]’s FollowMyHealth portal, you will also be able to view your health information using other applications (apps) compatible with our system.

## 2020-07-21 ENCOUNTER — APPOINTMENT (OUTPATIENT)
Dept: CARDIOLOGY | Facility: CLINIC | Age: 70
End: 2020-07-21

## 2020-07-30 ENCOUNTER — APPOINTMENT (OUTPATIENT)
Dept: ELECTROPHYSIOLOGY | Facility: CLINIC | Age: 70
End: 2020-07-30
Payer: MEDICARE

## 2020-07-30 VITALS
SYSTOLIC BLOOD PRESSURE: 152 MMHG | BODY MASS INDEX: 20.49 KG/M2 | WEIGHT: 120 LBS | DIASTOLIC BLOOD PRESSURE: 75 MMHG | HEIGHT: 64 IN | OXYGEN SATURATION: 98 %

## 2020-07-30 PROBLEM — I44.2 ATRIOVENTRICULAR BLOCK, COMPLETE: Chronic | Status: ACTIVE | Noted: 2020-07-13

## 2020-07-30 PROBLEM — F41.9 ANXIETY DISORDER, UNSPECIFIED: Chronic | Status: ACTIVE | Noted: 2020-07-13

## 2020-07-30 PROBLEM — L10.2 PEMPHIGUS FOLIACEOUS: Chronic | Status: ACTIVE | Noted: 2020-07-13

## 2020-07-30 PROBLEM — K43.9 VENTRAL HERNIA WITHOUT OBSTRUCTION OR GANGRENE: Chronic | Status: ACTIVE | Noted: 2020-07-13

## 2020-07-30 PROBLEM — I07.1 RHEUMATIC TRICUSPID INSUFFICIENCY: Chronic | Status: ACTIVE | Noted: 2020-07-13

## 2020-07-30 PROBLEM — I27.20 PULMONARY HYPERTENSION, UNSPECIFIED: Chronic | Status: ACTIVE | Noted: 2020-07-13

## 2020-07-30 PROCEDURE — 99024 POSTOP FOLLOW-UP VISIT: CPT

## 2020-08-06 RX ORDER — MULTIVITAMIN
TABLET ORAL
Refills: 0 | Status: ACTIVE | COMMUNITY

## 2020-08-06 RX ORDER — DIAPER,BRIEF,INFANT-TODD,DISP
50 EACH MISCELLANEOUS
Refills: 0 | Status: ACTIVE | COMMUNITY

## 2020-08-06 RX ORDER — ADHESIVE TAPE 3"X 2.3 YD
50 MCG TAPE, NON-MEDICATED TOPICAL
Refills: 0 | Status: ACTIVE | COMMUNITY
Start: 2020-08-06

## 2020-08-06 RX ORDER — CHOLECALCIFEROL (VITAMIN D3) 50 MCG
2000 CAPSULE ORAL
Refills: 0 | Status: DISCONTINUED | COMMUNITY
End: 2020-08-06

## 2020-08-11 ENCOUNTER — APPOINTMENT (OUTPATIENT)
Dept: ELECTROPHYSIOLOGY | Facility: CLINIC | Age: 70
End: 2020-08-11

## 2020-08-13 ENCOUNTER — APPOINTMENT (OUTPATIENT)
Dept: ELECTROPHYSIOLOGY | Facility: CLINIC | Age: 70
End: 2020-08-13

## 2020-08-20 ENCOUNTER — APPOINTMENT (OUTPATIENT)
Dept: ELECTROPHYSIOLOGY | Facility: CLINIC | Age: 70
End: 2020-08-20

## 2020-09-11 ENCOUNTER — APPOINTMENT (OUTPATIENT)
Dept: ELECTROPHYSIOLOGY | Facility: CLINIC | Age: 70
End: 2020-09-11
Payer: MEDICARE

## 2020-09-11 VITALS
HEART RATE: 64 BPM | WEIGHT: 116 LBS | BODY MASS INDEX: 19.81 KG/M2 | SYSTOLIC BLOOD PRESSURE: 168 MMHG | OXYGEN SATURATION: 100 % | HEIGHT: 64 IN | DIASTOLIC BLOOD PRESSURE: 97 MMHG

## 2020-09-11 PROCEDURE — 93290 INTERROG DEV EVAL ICPMS IP: CPT | Mod: 26

## 2020-09-11 PROCEDURE — 93283 PRGRMG EVAL IMPLANTABLE DFB: CPT

## 2020-09-25 ENCOUNTER — TRANSCRIPTION ENCOUNTER (OUTPATIENT)
Age: 70
End: 2020-09-25

## 2021-04-15 ENCOUNTER — NON-APPOINTMENT (OUTPATIENT)
Age: 71
End: 2021-04-15

## 2021-04-15 ENCOUNTER — APPOINTMENT (OUTPATIENT)
Dept: ELECTROPHYSIOLOGY | Facility: CLINIC | Age: 71
End: 2021-04-15
Payer: MEDICARE

## 2021-04-15 VITALS
OXYGEN SATURATION: 100 % | DIASTOLIC BLOOD PRESSURE: 79 MMHG | WEIGHT: 120 LBS | HEIGHT: 64 IN | SYSTOLIC BLOOD PRESSURE: 157 MMHG | BODY MASS INDEX: 20.49 KG/M2 | HEART RATE: 79 BPM

## 2021-04-15 DIAGNOSIS — I48.0 PAROXYSMAL ATRIAL FIBRILLATION: ICD-10-CM

## 2021-04-15 PROCEDURE — 93290 INTERROG DEV EVAL ICPMS IP: CPT | Mod: 26

## 2021-04-15 PROCEDURE — 93283 PRGRMG EVAL IMPLANTABLE DFB: CPT

## 2021-05-04 PROBLEM — I48.0 PAROXYSMAL ATRIAL FIBRILLATION: Status: ACTIVE | Noted: 2018-07-31

## 2021-10-19 ENCOUNTER — APPOINTMENT (OUTPATIENT)
Dept: ELECTROPHYSIOLOGY | Facility: CLINIC | Age: 71
End: 2021-10-19
Payer: MEDICARE

## 2021-10-19 VITALS
HEIGHT: 64 IN | HEART RATE: 88 BPM | DIASTOLIC BLOOD PRESSURE: 100 MMHG | WEIGHT: 125 LBS | RESPIRATION RATE: 14 BRPM | SYSTOLIC BLOOD PRESSURE: 169 MMHG | BODY MASS INDEX: 21.34 KG/M2 | OXYGEN SATURATION: 100 %

## 2021-10-19 PROCEDURE — 93283 PRGRMG EVAL IMPLANTABLE DFB: CPT

## 2021-10-19 PROCEDURE — 93290 INTERROG DEV EVAL ICPMS IP: CPT | Mod: 26

## 2021-11-04 NOTE — ASU PATIENT PROFILE, ADULT - NS TRANSFER PATIENT BELONGINGS
Problem: Adult Inpatient Plan of Care  Goal: Plan of Care Review  Outcome: Ongoing, Progressing  Flowsheets (Taken 11/4/2021 6505)  Progress: improving  Plan of Care Reviewed With: patient  Outcome Summary: VSS. Pt received 2x units of blood yesterday. Hgb now 9.5. Pt was choking on thin liquids/applesauce with meds- diet changed to NPO. Pt c/o of nausea- IV Zofran given per MAR. IVF infusing at 75 mls. Pt stable and needs met at this time.      credit cards x 1/Cell Phone/PDA (specify)/Clothing/Jewelry/Money (specify)

## 2022-02-15 ENCOUNTER — APPOINTMENT (OUTPATIENT)
Dept: ELECTROPHYSIOLOGY | Facility: CLINIC | Age: 72
End: 2022-02-15

## 2022-05-22 ENCOUNTER — EMERGENCY (EMERGENCY)
Facility: HOSPITAL | Age: 72
LOS: 0 days | Discharge: HOME | End: 2022-05-22
Attending: EMERGENCY MEDICINE
Payer: MEDICARE

## 2022-05-22 VITALS
TEMPERATURE: 98 F | RESPIRATION RATE: 16 BRPM | DIASTOLIC BLOOD PRESSURE: 70 MMHG | SYSTOLIC BLOOD PRESSURE: 158 MMHG | HEART RATE: 65 BPM | OXYGEN SATURATION: 95 %

## 2022-05-22 VITALS — WEIGHT: 149.91 LBS | HEIGHT: 64 IN

## 2022-05-22 DIAGNOSIS — I27.20 PULMONARY HYPERTENSION, UNSPECIFIED: ICD-10-CM

## 2022-05-22 DIAGNOSIS — R00.2 PALPITATIONS: ICD-10-CM

## 2022-05-22 DIAGNOSIS — I42.1 OBSTRUCTIVE HYPERTROPHIC CARDIOMYOPATHY: ICD-10-CM

## 2022-05-22 DIAGNOSIS — Z95.810 PRESENCE OF AUTOMATIC (IMPLANTABLE) CARDIAC DEFIBRILLATOR: Chronic | ICD-10-CM

## 2022-05-22 DIAGNOSIS — R42 DIZZINESS AND GIDDINESS: ICD-10-CM

## 2022-05-22 DIAGNOSIS — F41.9 ANXIETY DISORDER, UNSPECIFIED: ICD-10-CM

## 2022-05-22 LAB
ALBUMIN SERPL ELPH-MCNC: 3.9 G/DL — SIGNIFICANT CHANGE UP (ref 3.3–5)
ALP SERPL-CCNC: 127 U/L — HIGH (ref 40–120)
ALT FLD-CCNC: 39 U/L — SIGNIFICANT CHANGE UP (ref 12–78)
ANION GAP SERPL CALC-SCNC: 8 MMOL/L — SIGNIFICANT CHANGE UP (ref 5–17)
AST SERPL-CCNC: 32 U/L — SIGNIFICANT CHANGE UP (ref 15–37)
BASOPHILS # BLD AUTO: 0.02 K/UL — SIGNIFICANT CHANGE UP (ref 0–0.2)
BASOPHILS NFR BLD AUTO: 0.3 % — SIGNIFICANT CHANGE UP (ref 0–2)
BILIRUB SERPL-MCNC: 0.4 MG/DL — SIGNIFICANT CHANGE UP (ref 0.2–1.2)
BUN SERPL-MCNC: 17 MG/DL — SIGNIFICANT CHANGE UP (ref 7–23)
CALCIUM SERPL-MCNC: 9.4 MG/DL — SIGNIFICANT CHANGE UP (ref 8.5–10.1)
CHLORIDE SERPL-SCNC: 105 MMOL/L — SIGNIFICANT CHANGE UP (ref 96–108)
CO2 SERPL-SCNC: 26 MMOL/L — SIGNIFICANT CHANGE UP (ref 22–31)
CREAT SERPL-MCNC: 0.83 MG/DL — SIGNIFICANT CHANGE UP (ref 0.5–1.3)
EGFR: 76 ML/MIN/1.73M2 — SIGNIFICANT CHANGE UP
EOSINOPHIL # BLD AUTO: 0.04 K/UL — SIGNIFICANT CHANGE UP (ref 0–0.5)
EOSINOPHIL NFR BLD AUTO: 0.6 % — SIGNIFICANT CHANGE UP (ref 0–6)
GLUCOSE SERPL-MCNC: 116 MG/DL — HIGH (ref 70–99)
HCT VFR BLD CALC: 41.2 % — SIGNIFICANT CHANGE UP (ref 34.5–45)
HGB BLD-MCNC: 13.9 G/DL — SIGNIFICANT CHANGE UP (ref 11.5–15.5)
IMM GRANULOCYTES NFR BLD AUTO: 0.1 % — SIGNIFICANT CHANGE UP (ref 0–1.5)
LYMPHOCYTES # BLD AUTO: 1.97 K/UL — SIGNIFICANT CHANGE UP (ref 1–3.3)
LYMPHOCYTES # BLD AUTO: 28.6 % — SIGNIFICANT CHANGE UP (ref 13–44)
MAGNESIUM SERPL-MCNC: 2.1 MG/DL — SIGNIFICANT CHANGE UP (ref 1.6–2.6)
MCHC RBC-ENTMCNC: 31 PG — SIGNIFICANT CHANGE UP (ref 27–34)
MCHC RBC-ENTMCNC: 33.7 GM/DL — SIGNIFICANT CHANGE UP (ref 32–36)
MCV RBC AUTO: 92 FL — SIGNIFICANT CHANGE UP (ref 80–100)
MONOCYTES # BLD AUTO: 0.77 K/UL — SIGNIFICANT CHANGE UP (ref 0–0.9)
MONOCYTES NFR BLD AUTO: 11.2 % — SIGNIFICANT CHANGE UP (ref 2–14)
NEUTROPHILS # BLD AUTO: 4.08 K/UL — SIGNIFICANT CHANGE UP (ref 1.8–7.4)
NEUTROPHILS NFR BLD AUTO: 59.2 % — SIGNIFICANT CHANGE UP (ref 43–77)
PHOSPHATE SERPL-MCNC: 3 MG/DL — SIGNIFICANT CHANGE UP (ref 2.5–4.5)
PLATELET # BLD AUTO: 195 K/UL — SIGNIFICANT CHANGE UP (ref 150–400)
POTASSIUM SERPL-MCNC: 4.1 MMOL/L — SIGNIFICANT CHANGE UP (ref 3.5–5.3)
POTASSIUM SERPL-SCNC: 4.1 MMOL/L — SIGNIFICANT CHANGE UP (ref 3.5–5.3)
PROT SERPL-MCNC: 7.2 GM/DL — SIGNIFICANT CHANGE UP (ref 6–8.3)
RBC # BLD: 4.48 M/UL — SIGNIFICANT CHANGE UP (ref 3.8–5.2)
RBC # FLD: 13.3 % — SIGNIFICANT CHANGE UP (ref 10.3–14.5)
SODIUM SERPL-SCNC: 139 MMOL/L — SIGNIFICANT CHANGE UP (ref 135–145)
TROPONIN I, HIGH SENSITIVITY RESULT: 22.94 NG/L — SIGNIFICANT CHANGE UP
WBC # BLD: 6.89 K/UL — SIGNIFICANT CHANGE UP (ref 3.8–10.5)
WBC # FLD AUTO: 6.89 K/UL — SIGNIFICANT CHANGE UP (ref 3.8–10.5)

## 2022-05-22 PROCEDURE — 83735 ASSAY OF MAGNESIUM: CPT

## 2022-05-22 PROCEDURE — 36415 COLL VENOUS BLD VENIPUNCTURE: CPT

## 2022-05-22 PROCEDURE — 84100 ASSAY OF PHOSPHORUS: CPT

## 2022-05-22 PROCEDURE — 93005 ELECTROCARDIOGRAM TRACING: CPT

## 2022-05-22 PROCEDURE — 71045 X-RAY EXAM CHEST 1 VIEW: CPT | Mod: 26

## 2022-05-22 PROCEDURE — 71045 X-RAY EXAM CHEST 1 VIEW: CPT

## 2022-05-22 PROCEDURE — 93010 ELECTROCARDIOGRAM REPORT: CPT

## 2022-05-22 PROCEDURE — 99285 EMERGENCY DEPT VISIT HI MDM: CPT | Mod: 25

## 2022-05-22 PROCEDURE — 84484 ASSAY OF TROPONIN QUANT: CPT | Mod: 91

## 2022-05-22 PROCEDURE — 85025 COMPLETE CBC W/AUTO DIFF WBC: CPT

## 2022-05-22 PROCEDURE — 80053 COMPREHEN METABOLIC PANEL: CPT

## 2022-05-22 PROCEDURE — 99284 EMERGENCY DEPT VISIT MOD MDM: CPT

## 2022-05-22 RX ADMIN — Medication 0.5 MILLIGRAM(S): at 12:45

## 2022-05-22 NOTE — ED ADULT NURSE NOTE - OBJECTIVE STATEMENT
pt is 72 yo female presents to ED for anxiety started around 1130 while sitting down "my pulse was low."  pt denies any cp, palpitations, sob, or nvd.

## 2022-05-22 NOTE — ED PROVIDER NOTE - NS ED MD DISPO DISCHARGE
04/19/18 1900   Discharge Reassessment   Assessment Type Discharge Planning Reassessment   Provided patient/caregiver education on the expected discharge date and the discharge plan No   Do you have any problems affording any of your prescribed medications? No   Discharge Plan A Home;Home Health;Home with family   Discharge Plan B Other  (to be determined as patient progresses)   Patient choice form signed by patient/caregiver No   patient transferred out of ICU today then returned follow a code. SW met with wife and step son in lobby then with patient, wife and step son in ICU room. Patient able to talk with SW in ICU room. SW explained SW/CM will continue to follow with tx team, update as patient progresses.    Home

## 2022-05-22 NOTE — ED PROVIDER NOTE - PATIENT PORTAL LINK FT
You can access the FollowMyHealth Patient Portal offered by [facility] by registering at the following website: [email or web address]. By joining [facility]’s FollowMyHealth portal, you will also be able to view your health information using other applications (apps) compatible with our system.

## 2022-05-22 NOTE — ED PROVIDER NOTE - OBJECTIVE STATEMENT
70 y/o female with a PMHx  of anxiety, pulmonary HTN, pemphigus foliaceus, Defib, epigastric hernia, heart block, tricuspid regurgitation  presents to the ED c/o palpitations. States she was on the computer and she began to feel discomfort in the neck to upper abd. States she has had anxiety attacks in the past like this which just comes on suddenly. [employer] pacemaker placed in. Denies chest discomfort, CP. Notes she did not get any upsetting news.  On Atenolol. Denies smoking hx. 70 y/o female with a PMHx  of anxiety, pulmonary HTN, pemphigus foliaceus, Defib, epigastric hernia, heart block, tricuspid regurgitation, presents to the ED c/o palpitations. States she was on the computer and she began to feel discomfort in the neck to upper abd. States she has had anxiety attacks in the past like this which just comes on suddenly. [employer] pacemaker placed in. Denies chest discomfort, CP. Notes she did not get any upsetting news.  On Atenolol. Denies smoking hx.

## 2022-05-22 NOTE — ED PROVIDER NOTE - NSICDXPASTSURGICALHX_GEN_ALL_CORE_FT
PAST SURGICAL HISTORY:  AICD (automatic cardioverter/defibrillator) present left chest 11/2011

## 2022-05-22 NOTE — ED ADULT TRIAGE NOTE - CHIEF COMPLAINT QUOTE
pt presents to ED due to complaints of palpitations pt has hx of PPM Defib states she woke up today feeling anxious

## 2022-08-26 NOTE — DISCHARGE NOTE PROVIDER - CARE PROVIDER_API CALL
Michel Guerrero  CARDIAC ELECTROPHYSIOLOGY  [address] [place] [address]  Phone: [phone]  Fax: [phone]  Follow Up Time: EMS

## 2023-02-17 ENCOUNTER — NON-APPOINTMENT (OUTPATIENT)
Age: 73
End: 2023-02-17

## 2023-02-17 ENCOUNTER — APPOINTMENT (OUTPATIENT)
Dept: ELECTROPHYSIOLOGY | Facility: CLINIC | Age: 73
End: 2023-02-17
Payer: MEDICARE

## 2023-02-17 VITALS
BODY MASS INDEX: 19.95 KG/M2 | HEART RATE: 73 BPM | SYSTOLIC BLOOD PRESSURE: 123 MMHG | WEIGHT: 116.84 LBS | DIASTOLIC BLOOD PRESSURE: 80 MMHG | HEIGHT: 64 IN | OXYGEN SATURATION: 96 %

## 2023-02-17 DIAGNOSIS — I44.2 ATRIOVENTRICULAR BLOCK, COMPLETE: ICD-10-CM

## 2023-02-17 DIAGNOSIS — Z95.810 PRESENCE OF AUTOMATIC (IMPLANTABLE) CARDIAC DEFIBRILLATOR: ICD-10-CM

## 2023-02-17 DIAGNOSIS — I42.1 OBSTRUCTIVE HYPERTROPHIC CARDIOMYOPATHY: ICD-10-CM

## 2023-02-17 PROCEDURE — 93283 PRGRMG EVAL IMPLANTABLE DFB: CPT

## 2023-02-17 PROCEDURE — 93290 INTERROG DEV EVAL ICPMS IP: CPT | Mod: 26

## 2023-03-01 NOTE — PROCEDURE NOTE - NSANATOMICLLOCATION_GEN_A_CORE
left Topical Steroids Applications Pregnancy And Lactation Text: Most topical steroids are considered safe to use during pregnancy and lactation.  Any topical steroid applied to the breast or nipple should be washed off before breastfeeding.

## 2023-03-31 PROBLEM — I42.1 MILD HYPERTROPHIC OBSTRUCTIVE CARDIOMYOPATHY: Status: ACTIVE | Noted: 2017-05-09

## 2023-03-31 PROBLEM — I44.2 COMPLETE AV BLOCK: Status: ACTIVE | Noted: 2018-05-31

## 2023-03-31 PROBLEM — Z95.810 ICD (IMPLANTABLE CARDIOVERTER-DEFIBRILLATOR) IN PLACE: Status: ACTIVE | Noted: 2017-05-09

## 2023-07-17 ENCOUNTER — INPATIENT (INPATIENT)
Facility: HOSPITAL | Age: 73
LOS: 1 days | Discharge: ANOTHER HOSPITAL | DRG: 312 | End: 2023-07-19
Attending: INTERNAL MEDICINE | Admitting: INTERNAL MEDICINE
Payer: MEDICARE

## 2023-07-17 VITALS — WEIGHT: 115.08 LBS | HEIGHT: 64 IN

## 2023-07-17 DIAGNOSIS — Z95.810 PRESENCE OF AUTOMATIC (IMPLANTABLE) CARDIAC DEFIBRILLATOR: Chronic | ICD-10-CM

## 2023-07-17 LAB
APPEARANCE UR: CLEAR — SIGNIFICANT CHANGE UP
BASOPHILS # BLD AUTO: 0.03 K/UL — SIGNIFICANT CHANGE UP (ref 0–0.2)
BASOPHILS NFR BLD AUTO: 0.5 % — SIGNIFICANT CHANGE UP (ref 0–2)
BILIRUB UR-MCNC: NEGATIVE — SIGNIFICANT CHANGE UP
COLOR SPEC: YELLOW — SIGNIFICANT CHANGE UP
DIFF PNL FLD: NEGATIVE — SIGNIFICANT CHANGE UP
EOSINOPHIL # BLD AUTO: 0.04 K/UL — SIGNIFICANT CHANGE UP (ref 0–0.5)
EOSINOPHIL NFR BLD AUTO: 0.6 % — SIGNIFICANT CHANGE UP (ref 0–6)
GLUCOSE UR QL: NEGATIVE — SIGNIFICANT CHANGE UP
HCT VFR BLD CALC: 43.8 % — SIGNIFICANT CHANGE UP (ref 34.5–45)
HGB BLD-MCNC: 14.5 G/DL — SIGNIFICANT CHANGE UP (ref 11.5–15.5)
IMM GRANULOCYTES NFR BLD AUTO: 0.3 % — SIGNIFICANT CHANGE UP (ref 0–0.9)
KETONES UR-MCNC: NEGATIVE — SIGNIFICANT CHANGE UP
LEUKOCYTE ESTERASE UR-ACNC: ABNORMAL
LYMPHOCYTES # BLD AUTO: 1.77 K/UL — SIGNIFICANT CHANGE UP (ref 1–3.3)
LYMPHOCYTES # BLD AUTO: 26.6 % — SIGNIFICANT CHANGE UP (ref 13–44)
MCHC RBC-ENTMCNC: 31.3 PG — SIGNIFICANT CHANGE UP (ref 27–34)
MCHC RBC-ENTMCNC: 33.1 GM/DL — SIGNIFICANT CHANGE UP (ref 32–36)
MCV RBC AUTO: 94.4 FL — SIGNIFICANT CHANGE UP (ref 80–100)
MONOCYTES # BLD AUTO: 0.73 K/UL — SIGNIFICANT CHANGE UP (ref 0–0.9)
MONOCYTES NFR BLD AUTO: 11 % — SIGNIFICANT CHANGE UP (ref 2–14)
NEUTROPHILS # BLD AUTO: 4.07 K/UL — SIGNIFICANT CHANGE UP (ref 1.8–7.4)
NEUTROPHILS NFR BLD AUTO: 61 % — SIGNIFICANT CHANGE UP (ref 43–77)
NITRITE UR-MCNC: NEGATIVE — SIGNIFICANT CHANGE UP
PH UR: 5 — SIGNIFICANT CHANGE UP (ref 5–8)
PLATELET # BLD AUTO: 185 K/UL — SIGNIFICANT CHANGE UP (ref 150–400)
PROT UR-MCNC: NEGATIVE — SIGNIFICANT CHANGE UP
RBC # BLD: 4.64 M/UL — SIGNIFICANT CHANGE UP (ref 3.8–5.2)
RBC # FLD: 13.3 % — SIGNIFICANT CHANGE UP (ref 10.3–14.5)
SP GR SPEC: 1.01 — SIGNIFICANT CHANGE UP (ref 1.01–1.02)
UROBILINOGEN FLD QL: NEGATIVE — SIGNIFICANT CHANGE UP
WBC # BLD: 6.66 K/UL — SIGNIFICANT CHANGE UP (ref 3.8–10.5)
WBC # FLD AUTO: 6.66 K/UL — SIGNIFICANT CHANGE UP (ref 3.8–10.5)

## 2023-07-17 PROCEDURE — 99285 EMERGENCY DEPT VISIT HI MDM: CPT

## 2023-07-17 PROCEDURE — 71045 X-RAY EXAM CHEST 1 VIEW: CPT | Mod: 26

## 2023-07-17 PROCEDURE — 93010 ELECTROCARDIOGRAM REPORT: CPT

## 2023-07-17 RX ORDER — SODIUM CHLORIDE 9 MG/ML
500 INJECTION INTRAMUSCULAR; INTRAVENOUS; SUBCUTANEOUS ONCE
Refills: 0 | Status: COMPLETED | OUTPATIENT
Start: 2023-07-17 | End: 2023-07-17

## 2023-07-17 RX ADMIN — SODIUM CHLORIDE 500 MILLILITER(S): 9 INJECTION INTRAMUSCULAR; INTRAVENOUS; SUBCUTANEOUS at 23:20

## 2023-07-17 NOTE — ED ADULT NURSE NOTE - NSFALLRISKINTERV_ED_ALL_ED
Assistance OOB with selected safe patient handling equipment if applicable/Communicate fall risk and risk factors to all staff, patient, and family/Orthostatic vital signs/Provide visual cue: yellow wristband, yellow gown, etc/Reinforce activity limits and safety measures with patient and family/Call bell, personal items and telephone in reach/Instruct patient to call for assistance before getting out of bed/chair/stretcher/Non-slip footwear applied when patient is off stretcher/[place] to call system/Physically safe environment - no spills, clutter or unnecessary equipment/Purposeful Proactive Rounding/Room/bathroom lighting operational, light cord in reach

## 2023-07-17 NOTE — ED ADULT NURSE NOTE - OBJECTIVE STATEMENT
pt is a 73 y/o female c/c of syncope. pt reports she was walking with her spouse, lost consciousness and fell on the side of her face. pt reports shes had this episode before. Dr. Brush - Cardiologist. pt has AICD. pt denies palpitations, blurry vision, headache, nausea, fever, chills. speech clear. AAOX4. spouse at bedside.

## 2023-07-17 NOTE — ED ADULT NURSE NOTE - CHIEF COMPLAINT QUOTE
pt presents to ED with c/o of syncopal episode, pt reports "I was walking in the park, I started to feel anxious and than I passed out for like 5 seconds" pt reports hitting head on grass. no bruising or swelling noted to face, denies chest pain and SOB at this time, denies N/v pt on baby aspirin. EKG in progress

## 2023-07-17 NOTE — ED ADULT NURSE NOTE - BEFAST ARM NUMBNESS
Chief complaint:   Chief Complaint   Patient presents with   • Office Visit   • Establish Care       Vitals:  Visit Vitals  /80   Pulse 100   Temp 97.6 °F (36.4 °C)   Ht 6' 4\" (1.93 m)   Wt 71.2 kg (157 lb)   SpO2 99%   BMI 19.11 kg/m²       HISTORY OF PRESENT ILLNESS     34-year-old male with history of spontaneous pneumothorax and gastritis in the past comes here complaining about drainage on the back of his throat for about 3 weeks worse with eating certain foods like fatty steaks better coffee it makes the drainage much worse and it has been going on that 1 for a year.  Denies any fever chills headache sinus pressure.  He tried Nexium which worked for short time and omeprazole that gave him headache.  He also tried Mucinex for 6 days which did not help.  Back in December he presented with acid reflux symptoms as well which he thought might be related to heart condition.  He was evaluated in the emergency room.      Other significant problems:  Patient Active Problem List    Diagnosis Date Noted   • Primary spontaneous pneumothorax 06/21/2016     Priority: Low     2011, 2010, s/p surgery     • Heart palpitations 12/27/2014     Priority: Low   • Anxiety 12/27/2014     Priority: Low   • Epigastric abdominal pain 10/31/2014     Priority: Low   • Nausea 10/31/2014     Priority: Low   • Gastritis 10/31/2014     Priority: Low       PAST MEDICAL, FAMILY AND SOCIAL HISTORY     Medications:  Current Outpatient Medications   Medication   • FINASTERIDE PO     No current facility-administered medications for this visit.       Allergies:  ALLERGIES:   Allergen Reactions   • Codeine HEADACHES   • Percocet [Oxycodone-Acetaminophen] Nausea & Vomiting       Past Medical  History/Surgeries:  Past Medical History:   Diagnosis Date   • Back pain    • Bad breath    • Chest pain    • Chest tightness    • Cough    • Depression     or anxious feelings?   • Eye tearing    • Gastritis    • GERD (gastroesophageal reflux disease)    •  Headache    • Mouth breathing    • Wears glasses        Past Surgical History:   Procedure Laterality Date   • Lung surgery      spontaneous pneumothorax   • [place] tooth extraction         Family History:  Family History   Problem Relation Age of Onset   • Hypertension Mother    • Cancer Father         liver cancer   • Hypertension Maternal Grandmother    • Congestive Heart Failure Paternal Grandmother         CHF   • Allergies Other    • Gastrointestinal Other    • Stroke Other    • Glaucoma Other        Social History:  Social History     Tobacco Use   • Smoking status: Never Smoker   • Smokeless tobacco: Never Used   Substance Use Topics   • Alcohol use: No     Alcohol/week: 0.0 standard drinks     Comment: Occassionally-Maybe once per month.       REVIEW OF SYSTEMS     Review of Systems   Constitutional: Negative for chills, diaphoresis, fatigue and fever.   HENT: Positive for postnasal drip and sore throat (At times when it is very dry.). Negative for congestion, ear pain, rhinorrhea, sinus pressure, sinus pain, sneezing, trouble swallowing and voice change.    Respiratory: Negative for chest tightness and shortness of breath.    Cardiovascular: Negative for chest pain and palpitations.   Gastrointestinal: Negative for abdominal pain.        He does experience occasional heartburn.       PHYSICAL EXAM     Physical Exam   Visit Vitals  /80   Pulse 100   Temp 97.6 °F (36.4 °C)   Ht 6' 4\" (1.93 m)   Wt 71.2 kg (157 lb)   SpO2 99%   BMI 19.11 kg/m²     General appearance:  Patient is alert, pleasant, comfortable and in no acute distress.  EYES:  Eyelids without swelling or erythema: no conjunctival injection, anicteric sclerae; both pupils are round and reactive to light and accommodation.  NECK:  Neck is supple and without masses.  Trachea in midline.  LUNGS:  Clear to auscultation. Good air entry, good respiratory effort.  HEART:  Normal S1, S2.  Regular rate and rhythm.  No S3 gallop. No murmurs. No  bipedal edema.  SKIN: clear, warm, dry without rash or ulcers.  EXTREMITIES:  No joint erythema or joint swelling in both lower extremities.  Abdomen is soft nontender no hepatosplenomegaly or masses felt good bowel sounds.    ASSESSMENT/PLAN     PND (post-nasal drip)  (primary encounter diagnosis)  Plan: SERVICE TO ENT    Chronic throat clearing  Plan: SERVICE TO ENT  I strongly suspect laryngopharyngeal reflux possibly GERD as well.  Patient is asked to continue with Nexium and see ENT specialist.  Avoid spicy foods.  Avoid lying down after eating or going to bed <2 hours after eating.     No

## 2023-07-17 NOTE — ED ADULT NURSE NOTE - NSICDXFAMILYHX_GEN_ALL_CORE_FT
Patient's daughter left voice message after hours cancelling today's POC INR. She would like a lab draw as she is unable to bring her to the clinic today.   FAMILY HISTORY:  No pertinent family history in first degree relatives

## 2023-07-18 DIAGNOSIS — R55 SYNCOPE AND COLLAPSE: ICD-10-CM

## 2023-07-18 LAB
ALBUMIN SERPL ELPH-MCNC: 3.8 G/DL — SIGNIFICANT CHANGE UP (ref 3.3–5)
ALP SERPL-CCNC: 207 U/L — HIGH (ref 40–120)
ALT FLD-CCNC: 62 U/L — SIGNIFICANT CHANGE UP (ref 12–78)
ANION GAP SERPL CALC-SCNC: 3 MMOL/L — LOW (ref 5–17)
AST SERPL-CCNC: 50 U/L — HIGH (ref 15–37)
BACTERIA # UR AUTO: NEGATIVE — SIGNIFICANT CHANGE UP
BILIRUB SERPL-MCNC: 0.3 MG/DL — SIGNIFICANT CHANGE UP (ref 0.2–1.2)
BUN SERPL-MCNC: 19 MG/DL — SIGNIFICANT CHANGE UP (ref 7–23)
CALCIUM SERPL-MCNC: 8.9 MG/DL — SIGNIFICANT CHANGE UP (ref 8.5–10.1)
CHLORIDE SERPL-SCNC: 107 MMOL/L — SIGNIFICANT CHANGE UP (ref 96–108)
CO2 SERPL-SCNC: 27 MMOL/L — SIGNIFICANT CHANGE UP (ref 22–31)
CREAT SERPL-MCNC: 0.84 MG/DL — SIGNIFICANT CHANGE UP (ref 0.5–1.3)
EGFR: 75 ML/MIN/1.73M2 — SIGNIFICANT CHANGE UP
EPI CELLS # UR: NEGATIVE — SIGNIFICANT CHANGE UP
GLUCOSE SERPL-MCNC: 127 MG/DL — HIGH (ref 70–99)
HYALINE CASTS # UR AUTO: NEGATIVE /LPF — SIGNIFICANT CHANGE UP
POTASSIUM SERPL-MCNC: 4.7 MMOL/L — SIGNIFICANT CHANGE UP (ref 3.5–5.3)
POTASSIUM SERPL-SCNC: 4.7 MMOL/L — SIGNIFICANT CHANGE UP (ref 3.5–5.3)
PROT SERPL-MCNC: 7.1 GM/DL — SIGNIFICANT CHANGE UP (ref 6–8.3)
RBC CASTS # UR COMP ASSIST: NEGATIVE /HPF — SIGNIFICANT CHANGE UP (ref 0–4)
SODIUM SERPL-SCNC: 137 MMOL/L — SIGNIFICANT CHANGE UP (ref 135–145)
TROPONIN I, HIGH SENSITIVITY RESULT: 28.57 NG/L — SIGNIFICANT CHANGE UP
TROPONIN I, HIGH SENSITIVITY RESULT: 32.51 NG/L — SIGNIFICANT CHANGE UP
TSH SERPL-MCNC: 3.19 UU/ML — SIGNIFICANT CHANGE UP (ref 0.34–4.82)
WBC UR QL: SIGNIFICANT CHANGE UP /HPF (ref 0–5)

## 2023-07-18 PROCEDURE — 99223 1ST HOSP IP/OBS HIGH 75: CPT

## 2023-07-18 PROCEDURE — 86850 RBC ANTIBODY SCREEN: CPT

## 2023-07-18 PROCEDURE — 93283 PRGRMG EVAL IMPLANTABLE DFB: CPT | Mod: 26

## 2023-07-18 PROCEDURE — 36415 COLL VENOUS BLD VENIPUNCTURE: CPT

## 2023-07-18 PROCEDURE — 83036 HEMOGLOBIN GLYCOSYLATED A1C: CPT

## 2023-07-18 PROCEDURE — 80061 LIPID PANEL: CPT

## 2023-07-18 PROCEDURE — 85027 COMPLETE CBC AUTOMATED: CPT

## 2023-07-18 PROCEDURE — 93306 TTE W/DOPPLER COMPLETE: CPT

## 2023-07-18 PROCEDURE — 86803 HEPATITIS C AB TEST: CPT

## 2023-07-18 PROCEDURE — 86901 BLOOD TYPING SEROLOGIC RH(D): CPT

## 2023-07-18 PROCEDURE — 80048 BASIC METABOLIC PNL TOTAL CA: CPT

## 2023-07-18 PROCEDURE — 70450 CT HEAD/BRAIN W/O DYE: CPT | Mod: 26,MA

## 2023-07-18 PROCEDURE — 86900 BLOOD TYPING SEROLOGIC ABO: CPT

## 2023-07-18 PROCEDURE — 93306 TTE W/DOPPLER COMPLETE: CPT | Mod: 26

## 2023-07-18 RX ORDER — ATENOLOL 25 MG/1
12.5 TABLET ORAL DAILY
Refills: 0 | Status: DISCONTINUED | OUTPATIENT
Start: 2023-07-18 | End: 2023-07-19

## 2023-07-18 RX ORDER — ACETAMINOPHEN 500 MG
650 TABLET ORAL EVERY 6 HOURS
Refills: 0 | Status: DISCONTINUED | OUTPATIENT
Start: 2023-07-18 | End: 2023-07-19

## 2023-07-18 RX ORDER — ONDANSETRON 8 MG/1
4 TABLET, FILM COATED ORAL EVERY 8 HOURS
Refills: 0 | Status: DISCONTINUED | OUTPATIENT
Start: 2023-07-18 | End: 2023-07-19

## 2023-07-18 RX ORDER — ASPIRIN/CALCIUM CARB/MAGNESIUM 324 MG
1 TABLET ORAL
Qty: 0 | Refills: 0 | DISCHARGE

## 2023-07-18 RX ORDER — LANOLIN ALCOHOL/MO/W.PET/CERES
3 CREAM (GRAM) TOPICAL AT BEDTIME
Refills: 0 | Status: DISCONTINUED | OUTPATIENT
Start: 2023-07-18 | End: 2023-07-19

## 2023-07-18 RX ORDER — UBIDECARENONE 100 MG
200 CAPSULE ORAL
Qty: 0 | Refills: 0 | DISCHARGE

## 2023-07-18 RX ORDER — ASPIRIN/CALCIUM CARB/MAGNESIUM 324 MG
81 TABLET ORAL DAILY
Refills: 0 | Status: DISCONTINUED | OUTPATIENT
Start: 2023-07-18 | End: 2023-07-19

## 2023-07-18 RX ORDER — ATENOLOL 25 MG/1
12.5 TABLET ORAL
Qty: 0 | Refills: 0 | DISCHARGE

## 2023-07-18 RX ADMIN — Medication 81 MILLIGRAM(S): at 16:51

## 2023-07-18 NOTE — H&P ADULT - ASSESSMENT
73 yo female with PMH of  anxiety, pulmonary HTN, pemphigus foliaceus, Defib, epigastric hernia, heart block, tricuspid regurgitation, HOCM, palpitations, [employer] [facility] ppm for 11 years, p/w syncopal episode today. Pt was walking outside, felt she was going to pass out lowered herself and then fell over to the side on the grass.   71 yo female with PMH of  anxiety, pulmonary HTN, pemphigus foliaceus, Defib, epigastric hernia, heart block, tricuspid regurgitation, HOCM, palpitations, [employer] [facility] ppm for 11 years, p/w syncopal episode today. Pt was walking outside, felt she was going to pass out lowered herself and then fell over to the side on the grass.     patient describes panic attack like symptoms.   says she recently has been having alot of them      admit for telemetry  Dr. Pena for clearance and further reccomendations.       side note: patient wants to be dishcarged by end of day.

## 2023-07-18 NOTE — H&P ADULT - HISTORY OF PRESENT ILLNESS
73 yo female with PMH of  anxiety, pulmonary HTN, pemphigus foliaceus, Defib, epigastric hernia, heart block, tricuspid regurgitation, HOCM, palpitations, [employer] [facility] ppm for 11 years, p/w syncopal episode today. Pt was walking outside, felt she was going to pass out lowered herself and then fell over to the side on the grass.

## 2023-07-18 NOTE — ED PROVIDER NOTE - OBJECTIVE STATEMENT
71 yo female with ho ppm for 11 years, pw syncopal episode today. Pt was walking outside, felt she was going to pass out lowered herself and then fell over to the side on the grass. Pt has ho hypertropic cardiomyopathy

## 2023-07-18 NOTE — PATIENT PROFILE ADULT - NSTRANSFERBELONGINGSDISPO_GEN_A_NUR
with patient [facility] Pregnancy And Lactation Text: There is insufficient data to evaluate drug-associated risk for major birth defects, miscarriage, or other adverse maternal or fetal outcomes.  There is a pregnancy registry that monitors pregnancy outcomes in pregnant persons exposed to the medication during pregnancy.  It is unknown if this medication is excreted in breast milk.  Do not breastfeed during treatment and for about 4 weeks after the last dose.

## 2023-07-18 NOTE — ED PROVIDER NOTE - PROGRESS NOTE DETAILS
while looking at telemetry pt ahs long pauses with hr decreasing in to the 40's will admit. spoke with Dr. Kaur. pt may have significant bradycardia with aicd and with syncope. CHAYA Quinteros DO

## 2023-07-18 NOTE — CONSULT NOTE ADULT - ASSESSMENT
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 Patient has history of HOCM w/ complete heart block and has PPM. Electrophysiology consulted for device interrogation in the setting of syncopal event.  Upon interrogation of device no event recorded during syncope. Multiple events within the last 24 h of sensed VF/VT with inappropriate delivery of therapies and loss of capture of the RV lead. Episodes occurring daily since 7/4/23.  Recent events show oversensing of RV lead 2/2 noise, device inappropriately delivered ATP and had to abort a 35 J shock. Additionally RV pacing has been held for periods of 3-4 s due to oversensing of RV lead (Device identified VF).   Further review of previous episodes show intermittent loss of capture of RV lead. Impedance stable. This findings are concerning for RV lead malfunction.   Patient is at high risk for receiving inappropriate therapies, tachyarrhythmias therapies have been disabled.       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 Patient has history of HOCM w/ complete heart block and has PPM. Electrophysiology consulted for device interrogation in the setting of syncopal event.  Upon interrogation of device no event recorded during syncope. Multiple events within the last 24 h of sensed VF/VT with inappropriate delivery of therapies and loss of capture of the RV lead. Episodes occurring daily since 7/4/23.  Recent events show oversensing of RV lead 2/2 noise, device inappropriately delivered ATP and had to abort a 35 J shock. Additionally RV pacing has been held for periods of 3-4 s due to oversensing of RV lead (Device identified VF).   Further review of previous episodes show intermittent loss of capture of RV lead. Impedance stable. These findings are concerning for RV lead malfunction.     Patient is at high risk for receiving inappropriate therapies, tachyarrhythmias therapies have been disabled.   Mode remains DDDR 60/120.  Ventricular outputs raised for safety.      Plan  - Obtain ECHO stat  - Patient will likely need RV lead replacement  - Keep on CCM  - Continue Atenolol RV lead malfunction in the setting of complete heart block  HOCM  Patient with recent history of recurrent episodes of near-syncope and witnessed syncopal event in the last 24 h for which she was admitted to telemetry unit. Patient has history of HOCM w/ complete heart block and has PPM. Electrophysiology consulted for device interrogation in the setting of syncopal event.  Upon interrogation of device no event recorded during syncope. Multiple events within the last 24 h of sensed VF/VT with inappropriate delivery of therapies and loss of capture of the RV lead. Episodes occurring daily since 7/4/23.  Recent events show oversensing of RV lead 2/2 noise, device inappropriately delivered ATP and had to abort a 35 J shock. Additionally RV pacing has been held for periods of 3-4 s due to oversensing of RV lead (Device identified VF).   Further review of previous episodes show intermittent loss of capture of RV lead. Impedance stable. These findings are concerning for RV lead malfunction.     Patient is at high risk for receiving inappropriate therapies, tachyarrhythmias therapies have been disabled.   Mode remains DDDR 60/120.  Ventricular outputs raised for safety.      Plan  - Obtain ECHO stat  - Patient will likely need RV lead replacement  - Keep on CCM  - Continue Atenolol

## 2023-07-18 NOTE — H&P ADULT - NSHPPHYSICALEXAM_GEN_ALL_CORE
Vital Signs Last 24 Hrs  T(C): 36.5 (18 Jul 2023 08:29), Max: 37.1 (17 Jul 2023 20:37)  T(F): 97.7 (18 Jul 2023 08:29), Max: 98.8 (17 Jul 2023 20:37)  HR: 57 (18 Jul 2023 08:29) (54 - 65)  BP: 166/78 (18 Jul 2023 08:29) (154/82 - 185/87)  BP(mean): 114 (17 Jul 2023 20:37) (114 - 114)  RR: 16 (18 Jul 2023 08:29) (16 - 20)  SpO2: 99% (18 Jul 2023 08:29) (98% - 100%)    Parameters below as of 18 Jul 2023 08:29  Patient On (Oxygen Delivery Method): room air

## 2023-07-18 NOTE — CONSULT NOTE ADULT - SUBJECTIVE AND OBJECTIVE BOX
HPI:  71 yo female with PMH of  anxiety, pulmonary HTN, pemphigus foliaceus, Defib, epigastric hernia, heart block, tricuspid regurgitation, HOCM, palpitations, [facility] [facility] ppm for 11 years, p/w syncopal episode today.  Patient admitted to telemetry unit for recent episode of syncope. Patient mentions episode happened yester around 19:30, at the time patient was at home, resting. Patient mentions she started feeling weak and light headed. She mentions she has been experiencing recurrent similar episode since last May, whenever present patient usually kneels down and starts bearing down to avoid passing out. On the last episode patient performed these maneuvers, without any improvement of symptoms and lost consciousness. Patient had LOC for about 20-30 seconds. After regainign consciousness no focal neuro deficits, no loss of sphincter control and was able to remember surrounding circumstances.     PAST MEDICAL & SURGICAL HISTORY:  Cardiomyopathy, hypertrophic obstructive      Complete heart block      Tricuspid regurgitation      Epigastric hernia      Pemphigus foliaceus      Pulmonary hypertension, mild  ECHO 2018      Anxiety      AICD (automatic cardioverter/defibrillator) present  left chest 2011          MEDICATIONS  (STANDING):  aspirin enteric coated 81 milliGRAM(s) Oral daily  atenolol  Tablet 12.5 milliGRAM(s) Oral daily    MEDICATIONS  (PRN):  acetaminophen     Tablet .. 650 milliGRAM(s) Oral every 6 hours PRN Temp greater or equal to 38C (100.4F), Mild Pain (1 - 3)  aluminum hydroxide/magnesium hydroxide/simethicone Suspension 30 milliLiter(s) Oral every 4 hours PRN Dyspepsia  melatonin 3 milliGRAM(s) Oral at bedtime PRN Insomnia  ondansetron Injectable 4 milliGRAM(s) IV Push every 8 hours PRN Nausea and/or Vomiting      Allergies    No Known Allergies    Intolerances        SOCIAL HISTORY: Denies tobacco, etoh abuse or illicit drug use    FAMILY HISTORY:  No pertinent family history in first degree relatives        Vital Signs Last 24 Hrs  T(C): 36.9 (2023 09:53), Max: 37.1 (2023 20:37)  T(F): 98.4 (2023 09:53), Max: 98.8 (2023 20:37)  HR: 71 (2023 09:53) (54 - 71)  BP: 166/77 (2023 09:53) (154/82 - 185/87)  BP(mean): 114 (2023 20:37) (114 - 114)  RR: 18 (2023 09:53) (16 - 20)  SpO2: 97% (2023 09:53) (97% - 100%)    Parameters below as of 2023 09:53  Patient On (Oxygen Delivery Method): room air        REVIEW OF SYSTEMS:    CONSTITUTIONAL:  As per HPI.  HEENT:  Eyes:  No diplopia or blurred vision. ENT:  No earache, sore throat or runny nose.  CARDIOVASCULAR:  No pressure, squeezing, strangling, tightness, heaviness or aching about the chest, neck, axilla or epigastrium.  RESPIRATORY:  No cough, shortness of breath, PND or orthopnea.  GASTROINTESTINAL:  No nausea, vomiting or diarrhea.  GENITOURINARY:  No dysuria, frequency or urgency.  MUSCULOSKELETAL:  As per HPI.  SKIN:  No change in skin, hair or nails.  NEUROLOGIC:  No paresthesias, fasciculations, seizures or weakness.  PSYCHIATRIC:  No disorder of thought or mood.  ENDOCRINE:  No heat or cold intolerance, polyuria or polydipsia.  HEMATOLOGICAL:  No easy bruising or bleedings:  .     PHYSICAL EXAMINATION:    GENERAL APPEARANCE:  Pt. is not currently dyspneic, in no distress. Pt. is alert, oriented, and pleasant.  HEENT:  Pupils are normal and react normally. No icterus. Mucous membranes well colored.  NECK:  Supple. No lymphadenopathy. Jugular venous pressure not elevated. Carotids equal.   HEART:   The cardiac impulse has a normal quality. There are no murmurs, rubs or gallops noted  CHEST:  Chest is clear to auscultation. Normal respiratory effort.  ABDOMEN:  Soft and nontender.   EXTREMITIES:  There is no edema.   SKIN:  No rash or significant lesions are noted.    I&O's Summary      LABS:                        14.5   6.66  )-----------( 185      ( 2023 23:10 )             43.8     07-    137  |  107  |  19  ----------------------------<  127<H>  4.7   |  27  |  0.84    Ca    8.9      2023 23:10    TPro  7.1  /  Alb  3.8  /  TBili  0.3  /  DBili  x   /  AST  50<H>  /  ALT  62  /  AlkPhos  207<H>  07-17    LIVER FUNCTIONS - ( 2023 23:10 )  Alb: 3.8 g/dL / Pro: 7.1 gm/dL / ALK PHOS: 207 U/L / ALT: 62 U/L / AST: 50 U/L / GGT: x                 Urinalysis Basic - ( 2023 23:10 )    Color: Yellow / Appearance: Clear / S.010 / pH: x  Gluc: 127 mg/dL / Ketone: Negative  / Bili: Negative / Urobili: Negative   Blood: x / Protein: Negative / Nitrite: Negative   Leuk Esterase: Small / RBC: Negative /HPF / WBC 3-5 /HPF   Sq Epi: x / Non Sq Epi: x / Bacteria: Negative          EKG:      TELEMETRY:  Atrial paced rhythm with intermittent pauses    CARDIAC TESTS:    RADIOLOGY & ADDITIONAL STUDIES:  CTH (23): No evidence of acute intracranial hemorrhage, midline shift or CT evidence of acute territorial infarct.    ASSESSMENT & PLAN: HPI:  73 yo female with PMH of  anxiety, pulmonary HTN, pemphigus foliaceus, Defib, epigastric hernia, heart block, tricuspid regurgitation, HOCM, palpitations, [facility] [facility] ppm for 11 years, p/w syncopal episode today.  Patient admitted to telemetry unit for recent episode of syncope. Patient mentions episode happened yester around 19:30, at the time patient was at home, resting. Patient mentions she started feeling weak and light headed. She mentions she has been experiencing recurrent similar episode since last May, whenever present patient usually kneels down and starts bearing down to avoid passing out. On the last episode patient performed these maneuvers, without any improvement of symptoms and lost consciousness. Patient had LOC for about 20-30 seconds. After regainign consciousness no focal neuro deficits, no loss of sphincter control and was able to remember surrounding circumstances.     PAST MEDICAL & SURGICAL HISTORY:  Cardiomyopathy, hypertrophic obstructive      Complete heart block      Tricuspid regurgitation      Epigastric hernia      Pemphigus foliaceus      Pulmonary hypertension, mild  ECHO 2018      Anxiety      AICD (automatic cardioverter/defibrillator) present  left chest 2011          MEDICATIONS  (STANDING):  aspirin enteric coated 81 milliGRAM(s) Oral daily  atenolol  Tablet 12.5 milliGRAM(s) Oral daily    MEDICATIONS  (PRN):  acetaminophen     Tablet .. 650 milliGRAM(s) Oral every 6 hours PRN Temp greater or equal to 38C (100.4F), Mild Pain (1 - 3)  aluminum hydroxide/magnesium hydroxide/simethicone Suspension 30 milliLiter(s) Oral every 4 hours PRN Dyspepsia  melatonin 3 milliGRAM(s) Oral at bedtime PRN Insomnia  ondansetron Injectable 4 milliGRAM(s) IV Push every 8 hours PRN Nausea and/or Vomiting      Allergies    No Known Allergies    Intolerances        SOCIAL HISTORY: Denies tobacco, etoh abuse or illicit drug use    FAMILY HISTORY:  No pertinent family history in first degree relatives        Vital Signs Last 24 Hrs  T(C): 36.9 (2023 09:53), Max: 37.1 (2023 20:37)  T(F): 98.4 (2023 09:53), Max: 98.8 (2023 20:37)  HR: 71 (2023 09:53) (54 - 71)  BP: 166/77 (2023 09:53) (154/82 - 185/87)  BP(mean): 114 (2023 20:37) (114 - 114)  RR: 18 (2023 09:53) (16 - 20)  SpO2: 97% (2023 09:53) (97% - 100%)    Parameters below as of 2023 09:53  Patient On (Oxygen Delivery Method): room air        REVIEW OF SYSTEMS:    CONSTITUTIONAL:  As per HPI.  HEENT:  Eyes:  No diplopia or blurred vision. ENT:  No earache, sore throat or runny nose.  CARDIOVASCULAR:  No pressure, squeezing, strangling, tightness, heaviness or aching about the chest, neck, axilla or epigastrium.  RESPIRATORY:  No cough, shortness of breath, PND or orthopnea.  GASTROINTESTINAL:  No nausea, vomiting or diarrhea.  GENITOURINARY:  No dysuria, frequency or urgency.  MUSCULOSKELETAL:  As per HPI.  SKIN:  No change in skin, hair or nails.  NEUROLOGIC:  No paresthesias, fasciculations, seizures or weakness.  PSYCHIATRIC:  No disorder of thought or mood.  ENDOCRINE:  No heat or cold intolerance, polyuria or polydipsia.  HEMATOLOGICAL:  No easy bruising or bleedings:  .     PHYSICAL EXAMINATION:    GENERAL APPEARANCE:  Pt. is not currently dyspneic, in no distress. Pt. is alert, oriented, and pleasant.  HEENT:  Pupils are normal and react normally. No icterus. Mucous membranes well colored.  NECK:  Supple. No lymphadenopathy. Jugular venous pressure not elevated. Carotids equal.   HEART:   The cardiac impulse has a normal quality. There are no murmurs, rubs or gallops noted  CHEST:  Chest is clear to auscultation. Normal respiratory effort.  ABDOMEN:  Soft and nontender.   EXTREMITIES:  There is no edema.   SKIN:  No rash or significant lesions are noted.    I&O's Summary      LABS:                        14.5   6.66  )-----------( 185      ( 2023 23:10 )             43.8     07-    137  |  107  |  19  ----------------------------<  127<H>  4.7   |  27  |  0.84    Ca    8.9      2023 23:10    TPro  7.1  /  Alb  3.8  /  TBili  0.3  /  DBili  x   /  AST  50<H>  /  ALT  62  /  AlkPhos  207<H>      LIVER FUNCTIONS - ( 2023 23:10 )  Alb: 3.8 g/dL / Pro: 7.1 gm/dL / ALK PHOS: 207 U/L / ALT: 62 U/L / AST: 50 U/L / GGT: x                 Urinalysis Basic - ( 2023 23:10 )    Color: Yellow / Appearance: Clear / S.010 / pH: x  Gluc: 127 mg/dL / Ketone: Negative  / Bili: Negative / Urobili: Negative   Blood: x / Protein: Negative / Nitrite: Negative   Leuk Esterase: Small / RBC: Negative /HPF / WBC 3-5 /HPF   Sq Epi: x / Non Sq Epi: x / Bacteria: Negative          EKG:      TELEMETRY:  Atrial paced - Ventricular paced rhythm    CARDIAC TESTS:    RADIOLOGY & ADDITIONAL STUDIES:  CTH (23): No evidence of acute intracranial hemorrhage, midline shift or CT evidence of acute territorial infarct.  CXR (23): No evident lead dislodgement or crush.     ASSESSMENT & PLAN:

## 2023-07-18 NOTE — ED PROVIDER NOTE - CLINICAL SUMMARY MEDICAL DECISION MAKING FREE TEXT BOX
pt with aicd from obstructive cardiolmyopathy with hr decreasing into the 40's and syncopa episode today, will get labs, including troponin and admit CHAYA Quinteros DO

## 2023-07-18 NOTE — PROCEDURE NOTE - INTERROGATION NOTE: COMMENTS
Multiple episodes of noise on ventricular lead and episodes of non-capture with pauses.  ATP with aborted therapy noted.

## 2023-07-18 NOTE — ED PROVIDER NOTE - INTERNATIONAL TRAVEL
EKG My Reading:  Rate: 147  Rhythm: sinus tachy  ST Segments: T inversion III  STEMI: no  QTc: 400 / normal  Comparison to prior: not significantly changed from earlier today       Tiffany Zhang MD  11/05/22 1170
No

## 2023-07-18 NOTE — ED PROVIDER NOTE - PHYSICAL EXAMINATION
Gen:  Well appearing in NAD  Head:  NC/AT  HEENT: pupils perrl,no pharyngeal erythema, uvula midline  Cardiac: S1S2, RRR  Abd: Soft, non tender  Resp: No distress, CTA   musculoskeletal:: no deformities, no swelling, strength +5/+5  Skin: warm and dry as visualized, no rashes  Neuro: IYER, aao x 4  Psych:alert, cooperative, appropriate mood and affect for situation

## 2023-07-18 NOTE — ED ADULT NURSE REASSESSMENT NOTE - NS ED NURSE REASSESS COMMENT FT1
assume care from jorden rn. pt resting comfortably no s&s of distress. pt rhythm is av paced 50-60's with 2-4 second pause. md aware. pt A&OX4 speaking in full and complete sentences.

## 2023-07-18 NOTE — PROCEDURE NOTE - ADDITIONAL PROCEDURE DETAILS
Pt admitted with syncopal episode yesterday and she endorses episodes of near syncope for the past couple of months which would get better when she beared down.  Tachy therapy settings are now disabled.    Mode remains DDDR 60/120.  Ventricular outputs raised for safety.  Pt will need RV lead replacement, she may need lead extraction, plan to be determined by Dr. Guerrero.  Stat 2D echo ordered, PMHx HCM.  Discussed with patient, RN, hospitalist MD

## 2023-07-18 NOTE — H&P ADULT - NSHPLABSRESULTS_GEN_ALL_CORE
Urinalysis Basic - ( 2023 23:10 )    Color: Yellow / Appearance: Clear / S.010 / pH: x  Gluc: 127 mg/dL / Ketone: Negative  / Bili: Negative / Urobili: Negative   Blood: x / Protein: Negative / Nitrite: Negative   Leuk Esterase: Small / RBC: Negative /HPF / WBC 3-5 /HPF   Sq Epi: x / Non Sq Epi: x / Bacteria: Negative    2023 23:10    137    |  107    |  19     ----------------------------<  127    4.7     |  27     |  0.84     Ca    8.9        2023 23:10    TPro  7.1    /  Alb  3.8    /  TBili  0.3    /  DBili  x      /  AST  50     /  ALT  62     /  AlkPhos  207    2023 23:10  LIVER FUNCTIONS - ( 2023 23:10 )  Alb: 3.8 g/dL / Pro: 7.1 gm/dL / ALK PHOS: 207 U/L / ALT: 62 U/L / AST: 50 U/L / GGT: x         CBC Full  -  ( 2023 23:10 )  WBC Count : 6.66 K/uL  Hemoglobin : 14.5 g/dL  Hematocrit : 43.8 %  Platelet Count - Automated : 185 K/uL  Mean Cell Volume : 94.4 fl  Mean Cell Hemoglobin : 31.3 pg  Mean Cell Hemoglobin Concentration : 33.1 gm/dL  Auto Neutrophil # : 4.07 K/uL  Auto Lymphocyte # : 1.77 K/uL  Auto Monocyte # : 0.73 K/uL  Auto Eosinophil # : 0.04 K/uL  Auto Basophil # : 0.03 K/uL  Auto Neutrophil % : 61.0 %  Auto Lymphocyte % : 26.6 %  Auto Monocyte % : 11.0 %  Auto Eosinophil % : 0.6 %  Auto Basophil % : 0.5 %

## 2023-07-18 NOTE — CHART NOTE - NSCHARTNOTEFT_GEN_A_CORE
Dr. Guerrero spoke with Dr. Burciaga at [facility] who will accept patient.  Device reprogrammed DOO @ 80 bpm and tachy therapies are disabled.  Pt is NPO after MN tonight.  Pt and her  are in agreement with plan.  Hospitalist to hospitalist transfer is pending

## 2023-07-19 ENCOUNTER — INPATIENT (INPATIENT)
Facility: HOSPITAL | Age: 73
LOS: 2 days | Discharge: HOME | DRG: 227 | End: 2023-07-22
Attending: INTERNAL MEDICINE | Admitting: INTERNAL MEDICINE
Payer: MEDICARE

## 2023-07-19 ENCOUNTER — TRANSCRIPTION ENCOUNTER (OUTPATIENT)
Age: 73
End: 2023-07-19

## 2023-07-19 VITALS
SYSTOLIC BLOOD PRESSURE: 133 MMHG | HEART RATE: 79 BPM | OXYGEN SATURATION: 95 % | DIASTOLIC BLOOD PRESSURE: 85 MMHG | RESPIRATION RATE: 18 BRPM | TEMPERATURE: 98 F

## 2023-07-19 VITALS — HEIGHT: 64 IN | WEIGHT: 110.01 LBS

## 2023-07-19 DIAGNOSIS — Z95.810 PRESENCE OF AUTOMATIC (IMPLANTABLE) CARDIAC DEFIBRILLATOR: Chronic | ICD-10-CM

## 2023-07-19 DIAGNOSIS — I49.9 CARDIAC ARRHYTHMIA, UNSPECIFIED: ICD-10-CM

## 2023-07-19 LAB
A1C WITH ESTIMATED AVERAGE GLUCOSE RESULT: 5.7 % — HIGH (ref 4–5.6)
ABO RH CONFIRMATION: SIGNIFICANT CHANGE UP
ADD ON TEST-SPECIMEN IN LAB: SIGNIFICANT CHANGE UP
ANION GAP SERPL CALC-SCNC: 4 MMOL/L — LOW (ref 5–17)
BLD GP AB SCN SERPL QL: SIGNIFICANT CHANGE UP
BUN SERPL-MCNC: 10 MG/DL — SIGNIFICANT CHANGE UP (ref 7–23)
CALCIUM SERPL-MCNC: 9.2 MG/DL — SIGNIFICANT CHANGE UP (ref 8.5–10.1)
CHLORIDE SERPL-SCNC: 111 MMOL/L — HIGH (ref 96–108)
CHOLEST SERPL-MCNC: 212 MG/DL — HIGH
CO2 SERPL-SCNC: 27 MMOL/L — SIGNIFICANT CHANGE UP (ref 22–31)
CREAT SERPL-MCNC: 0.7 MG/DL — SIGNIFICANT CHANGE UP (ref 0.5–1.3)
EGFR: 93 ML/MIN/1.73M2 — SIGNIFICANT CHANGE UP
ESTIMATED AVERAGE GLUCOSE: 117 MG/DL — HIGH (ref 68–114)
GLUCOSE SERPL-MCNC: 86 MG/DL — SIGNIFICANT CHANGE UP (ref 70–99)
HCT VFR BLD CALC: 44.7 % — SIGNIFICANT CHANGE UP (ref 34.5–45)
HCV AB S/CO SERPL IA: 0.1 S/CO — SIGNIFICANT CHANGE UP (ref 0–0.99)
HCV AB SERPL-IMP: SIGNIFICANT CHANGE UP
HDLC SERPL-MCNC: 119 MG/DL — SIGNIFICANT CHANGE UP
HGB BLD-MCNC: 15.1 G/DL — SIGNIFICANT CHANGE UP (ref 11.5–15.5)
LIPID PNL WITH DIRECT LDL SERPL: 85 MG/DL — SIGNIFICANT CHANGE UP
MCHC RBC-ENTMCNC: 31.5 PG — SIGNIFICANT CHANGE UP (ref 27–34)
MCHC RBC-ENTMCNC: 33.8 GM/DL — SIGNIFICANT CHANGE UP (ref 32–36)
MCV RBC AUTO: 93.3 FL — SIGNIFICANT CHANGE UP (ref 80–100)
NON HDL CHOLESTEROL: 93 MG/DL — SIGNIFICANT CHANGE UP
PLATELET # BLD AUTO: 192 K/UL — SIGNIFICANT CHANGE UP (ref 150–400)
POTASSIUM SERPL-MCNC: 4.3 MMOL/L — SIGNIFICANT CHANGE UP (ref 3.5–5.3)
POTASSIUM SERPL-SCNC: 4.3 MMOL/L — SIGNIFICANT CHANGE UP (ref 3.5–5.3)
RBC # BLD: 4.79 M/UL — SIGNIFICANT CHANGE UP (ref 3.8–5.2)
RBC # FLD: 13.2 % — SIGNIFICANT CHANGE UP (ref 10.3–14.5)
SODIUM SERPL-SCNC: 142 MMOL/L — SIGNIFICANT CHANGE UP (ref 135–145)
TRIGL SERPL-MCNC: 39 MG/DL — SIGNIFICANT CHANGE UP
WBC # BLD: 5.96 K/UL — SIGNIFICANT CHANGE UP (ref 3.8–10.5)
WBC # FLD AUTO: 5.96 K/UL — SIGNIFICANT CHANGE UP (ref 3.8–10.5)

## 2023-07-19 PROCEDURE — 99223 1ST HOSP IP/OBS HIGH 75: CPT

## 2023-07-19 PROCEDURE — 93010 ELECTROCARDIOGRAM REPORT: CPT

## 2023-07-19 PROCEDURE — 99239 HOSP IP/OBS DSCHRG MGMT >30: CPT

## 2023-07-19 RX ORDER — ACETAMINOPHEN 500 MG
650 TABLET ORAL EVERY 6 HOURS
Refills: 0 | Status: DISCONTINUED | OUTPATIENT
Start: 2023-07-19 | End: 2023-07-21

## 2023-07-19 RX ORDER — ATENOLOL 25 MG/1
12.5 TABLET ORAL DAILY
Refills: 0 | Status: DISCONTINUED | OUTPATIENT
Start: 2023-07-19 | End: 2023-07-21

## 2023-07-19 RX ADMIN — Medication 81 MILLIGRAM(S): at 11:10

## 2023-07-19 RX ADMIN — Medication 650 MILLIGRAM(S): at 22:10

## 2023-07-19 RX ADMIN — ATENOLOL 12.5 MILLIGRAM(S): 25 TABLET ORAL at 11:10

## 2023-07-19 RX ADMIN — Medication 650 MILLIGRAM(S): at 21:40

## 2023-07-19 NOTE — DISCHARGE NOTE PROVIDER - NSDCMRMEDTOKEN_GEN_ALL_CORE_FT
atenolol 25 mg oral tablet: 12.5 milligram(s) orally once a day  Low Dose ASA 81 mg oral tablet: 1 tab(s) orally once a day

## 2023-07-19 NOTE — DISCHARGE NOTE NURSING/CASE MANAGEMENT/SOCIAL WORK - PATIENT PORTAL LINK FT
You can access the FollowMyHealth Patient Portal offered by [facility] by registering at the following website: [email or web address]. By joining [employer]’s FollowMyHealth portal, you will also be able to view your health information using other applications (apps) compatible with our system.

## 2023-07-19 NOTE — H&P CARDIOLOGY - NS ATTEND AMEND GEN_ALL_CORE FT
patient with HCM, CHB and failing ICD lead  options, procedures, risks discussed  plan for extraction and reimplant

## 2023-07-19 NOTE — H&P CARDIOLOGY - HISTORY OF PRESENT ILLNESS
73 y/o female, PMHx of anxiety, pulmonary HTN, pemphigus foliaceus, Defib, epigastric hernia, HOCM, heart block, [employer] Judes ppm for 11 years, tricuspid regurgitation, palpitations, presented to [facility] on 7/19/23 after a syncopal episode. Pt was walking outside, felt she was going to pass out, lowered herself and then fell over to the side on the grass. 71 y/o female, PMHx of anxiety, pulmonary HTN, pemphigus foliaceus, Defib, epigastric hernia, HOCM, heart block, St[employer] Judes ppm for 11 years, tricuspid regurgitation, palpitations, presented to [facility] on 7/19/23 after a syncopal episode. Pt was walking outside, felt she was going to pass out, lowered herself and then fell over to the side on the grass.    The patient's device was found to have a lead fracture with noise. It was reprogrammed to DOO and the patient was transferred for consideration for extraction with reimplant

## 2023-07-19 NOTE — DISCHARGE NOTE NURSING/CASE MANAGEMENT/SOCIAL WORK - NSDCPEFALRISK_GEN_ALL_CORE
For information on Fall & Injury Prevention, visit: [email or web address] OR  [email or web address] OR  https://www.cdc.gov/steadi/patient.html

## 2023-07-19 NOTE — DISCHARGE NOTE PROVIDER - NSDCFUSCHEDAPPT_GEN_ALL_CORE_FT
Michel Guerrero  [facility]  [address]  Scheduled Appointment: 08/18/2023

## 2023-07-19 NOTE — DISCHARGE NOTE PROVIDER - HOSPITAL COURSE
PHYSICAL EXAM:    Daily     Daily     Vital Signs Last 24 Hrs  T(C): 36.7 (19 Jul 2023 07:43), Max: 37 (18 Jul 2023 20:10)  T(F): 98 (19 Jul 2023 07:43), Max: 98.6 (18 Jul 2023 20:10)  HR: 79 (19 Jul 2023 07:43) (79 - 81)  BP: 133/85 (19 Jul 2023 07:43) (133/85 - 160/82)  BP(mean): --  RR: 18 (19 Jul 2023 07:43) (17 - 18)  SpO2: 95% (19 Jul 2023 07:43) (95% - 97%)    Constitutional: Well  appearing  HEENT: Atraumatic, FIDEL, Normal, No congestion  Respiratory: Breath Sounds normal, no rhonchi/wheeze  Cardiovascular: N S1S2;   Gastrointestinal: Abdomen soft, non tender, Bowel Sounds present  Extremities: No edema, peripheral pulses present  Neurological: AAO x 3, no gross focal motor deficits  Skin: Non cellulitic, no rash, ulcers  Lymph Nodes: No lymphadenopathy noted  Back: No CVA tenderness   Musculoskeletal: non tender  Breasts: Deferred  Genitourinary: deferred  Rectal: Deferred     73 yo female with PMH of  anxiety, pulmonary HTN, pemphigus foliaceus,  epigastric hernia, heart block, tricuspid regurgitation, HOCM, palpitations, St. Judes ppm for 11 years, p/w syncopal episode today. Pt was walking outside, felt she was going to pass out lowered herself and then fell over to the side on the grass.     patient describes panic attack like symptoms.   says she recently has been having a lot  of them    Pt admitted with    RV lead malfunction:  transfer to [facility] under Dr. Burciaga for lead replacement    transfer center called    pt consent for transfer in chart    time spent 45 min

## 2023-07-19 NOTE — DISCHARGE NOTE PROVIDER - CARE PROVIDER_API CALL
pcp,   Phone: (   )    -  Fax: (   )    -  Follow Up Time:     Michel Guerrero  Cardiovascular Disease  [address] [place] [id number]  Phone: [phone]  Fax: [phone]  Follow Up Time:

## 2023-07-20 DIAGNOSIS — R55 SYNCOPE AND COLLAPSE: ICD-10-CM

## 2023-07-20 DIAGNOSIS — T82.110A BREAKDOWN (MECHANICAL) OF CARDIAC ELECTRODE, INITIAL ENCOUNTER: ICD-10-CM

## 2023-07-20 LAB
BLD GP AB SCN SERPL QL: NEGATIVE — SIGNIFICANT CHANGE UP
RH IG SCN BLD-IMP: POSITIVE — SIGNIFICANT CHANGE UP
RH IG SCN BLD-IMP: POSITIVE — SIGNIFICANT CHANGE UP

## 2023-07-20 PROCEDURE — 99231 SBSQ HOSP IP/OBS SF/LOW 25: CPT

## 2023-07-20 RX ORDER — ASPIRIN/CALCIUM CARB/MAGNESIUM 324 MG
81 TABLET ORAL DAILY
Refills: 0 | Status: DISCONTINUED | OUTPATIENT
Start: 2023-07-20 | End: 2023-07-21

## 2023-07-20 RX ADMIN — Medication 81 MILLIGRAM(S): at 06:20

## 2023-07-20 RX ADMIN — ATENOLOL 12.5 MILLIGRAM(S): 25 TABLET ORAL at 06:19

## 2023-07-21 ENCOUNTER — TRANSCRIPTION ENCOUNTER (OUTPATIENT)
Age: 73
End: 2023-07-21

## 2023-07-21 PROCEDURE — 33249 INSJ/RPLCMT DEFIB W/LEAD(S): CPT

## 2023-07-21 PROCEDURE — 71045 X-RAY EXAM CHEST 1 VIEW: CPT | Mod: 26

## 2023-07-21 PROCEDURE — 93287 PERI-PX DEVICE EVAL & PRGR: CPT | Mod: 26,59

## 2023-07-21 PROCEDURE — 33244 REMOVE ELCTRD TRANSVENOUSLY: CPT

## 2023-07-21 PROCEDURE — 99231 SBSQ HOSP IP/OBS SF/LOW 25: CPT

## 2023-07-21 PROCEDURE — 93010 ELECTROCARDIOGRAM REPORT: CPT

## 2023-07-21 DEVICE — SHEATH PRELUDE 7FX25CM
Type: IMPLANTABLE DEVICE | Status: NON-FUNCTIONAL
Removed: 2023-07-21

## 2023-07-21 DEVICE — ENVELOPE TYRX ABSORBABLE ANTIBACTERIAL LG
Type: IMPLANTABLE DEVICE | Status: NON-FUNCTIONAL
Removed: 2023-07-21

## 2023-07-21 DEVICE — SHEATH GLIDELIGHT LASER 16FR
Type: IMPLANTABLE DEVICE | Status: NON-FUNCTIONAL
Removed: 2023-07-21

## 2023-07-21 DEVICE — IMPLANTABLE DEVICE
Type: IMPLANTABLE DEVICE | Status: NON-FUNCTIONAL
Removed: 2023-07-21

## 2023-07-21 DEVICE — SHEATH PRELUDE 8FX25CM
Type: IMPLANTABLE DEVICE | Status: NON-FUNCTIONAL
Removed: 2023-07-21

## 2023-07-21 DEVICE — DEVICE LOCKING LOCKING LLD EZ CLEARS
Type: IMPLANTABLE DEVICE | Status: NON-FUNCTIONAL
Removed: 2023-07-21

## 2023-07-21 DEVICE — GUIDEWIRE AMPLATZ SUPER-STIFF 3MM J .035" X 145CM
Type: IMPLANTABLE DEVICE | Status: NON-FUNCTIONAL
Removed: 2023-07-21

## 2023-07-21 DEVICE — KIT A-LINE 1LUM 20G X 12CM SAFE KIT
Type: IMPLANTABLE DEVICE | Status: NON-FUNCTIONAL
Removed: 2023-07-21

## 2023-07-21 DEVICE — KIT BRIDGE ACESSORY
Type: IMPLANTABLE DEVICE | Status: NON-FUNCTIONAL
Removed: 2023-07-21

## 2023-07-21 DEVICE — LEAD PACE VENT CAPSUR Z 52CM
Type: IMPLANTABLE DEVICE | Status: NON-FUNCTIONAL
Removed: 2023-07-21

## 2023-07-21 DEVICE — CATH EP QUAD SUP JSN 5FRX120CM
Type: IMPLANTABLE DEVICE | Status: NON-FUNCTIONAL
Removed: 2023-07-21

## 2023-07-21 DEVICE — SHEATH FAST-CATH 6FR
Type: IMPLANTABLE DEVICE | Status: NON-FUNCTIONAL
Removed: 2023-07-21

## 2023-07-21 RX ORDER — BENZOCAINE AND MENTHOL 5; 1 G/100ML; G/100ML
1 LIQUID ORAL ONCE
Refills: 0 | Status: DISCONTINUED | OUTPATIENT
Start: 2023-07-21 | End: 2023-07-22

## 2023-07-21 RX ORDER — ONDANSETRON 8 MG/1
4 TABLET, FILM COATED ORAL ONCE
Refills: 0 | Status: DISCONTINUED | OUTPATIENT
Start: 2023-07-21 | End: 2023-07-21

## 2023-07-21 RX ORDER — ASPIRIN/CALCIUM CARB/MAGNESIUM 324 MG
81 TABLET ORAL DAILY
Refills: 0 | Status: DISCONTINUED | OUTPATIENT
Start: 2023-07-21 | End: 2023-07-22

## 2023-07-21 RX ORDER — HYDROMORPHONE HYDROCHLORIDE 2 MG/ML
0.25 INJECTION INTRAMUSCULAR; INTRAVENOUS; SUBCUTANEOUS
Refills: 0 | Status: DISCONTINUED | OUTPATIENT
Start: 2023-07-21 | End: 2023-07-21

## 2023-07-21 RX ORDER — ATENOLOL 25 MG/1
12.5 TABLET ORAL DAILY
Refills: 0 | Status: DISCONTINUED | OUTPATIENT
Start: 2023-07-21 | End: 2023-07-22

## 2023-07-21 RX ADMIN — Medication 81 MILLIGRAM(S): at 06:02

## 2023-07-21 RX ADMIN — HYDROMORPHONE HYDROCHLORIDE 0.25 MILLIGRAM(S): 2 INJECTION INTRAMUSCULAR; INTRAVENOUS; SUBCUTANEOUS at 18:48

## 2023-07-21 RX ADMIN — HYDROMORPHONE HYDROCHLORIDE 0.25 MILLIGRAM(S): 2 INJECTION INTRAMUSCULAR; INTRAVENOUS; SUBCUTANEOUS at 18:56

## 2023-07-21 RX ADMIN — ATENOLOL 12.5 MILLIGRAM(S): 25 TABLET ORAL at 06:02

## 2023-07-21 NOTE — PROGRESS NOTE ADULT - PROBLEM SELECTOR PLAN 1
Telemetry monitoring  Serial EKG  Continue medication with hold parameters
Telemetry monitoring  Serial EKG

## 2023-07-21 NOTE — DISCHARGE NOTE PROVIDER - NSDCFUADDINST_GEN_ALL_CORE_FT
WOUND CARE:  Do NOT scrub, rub, or pick at your incision site  AFTER 3 DAYS you may SHOWER  - use mild soap and gentle warm, water stream, pat dry  DO NOT apply lotions, creams, ointments, powder, parfumes to your incision site  DO NOT SOAK your site for 4-6 weeks ( no baths, no pools, no tubs, etc...)  wear loose clothing around site for 1-2 weeks  IF surgical tape was used DO NOT remove the strips, they will fall off after 7days, if glue was used, it will naturally fall off within 3 weeks  if staples were used, they will be removed in 7-10 days by your doctor    ACTIVITY:  for 2 weeks AFTER  your procedure  - DO NOT RAISE your arm above shoulder level ( on the same side of your incision)  for 4 weeks AFTER your procedure   - DO NOT LIFT anything 10 lbs or heavier ( on the side of your implant)   - certain activities may be limited longer, those that involve swinging your arm, and will be discussed with your EP doctor  DO NOT DRIVE until your EP Doctor or nurse practitioner/ physician assistant states it is safe to do so  A follow up appointment in 7-14 days will be arranged before your discharge    ID CARD:   you will receive an ID CARD and device company booklet   - please carry that card with you at all times    ***CALL YOUR DOCTOR ***  IF you have fever, chills, body aches, or severe pain, swelling, redness, heat, yellow drainage from your incision site  IF bleeding or significant new swelling from your puncture site  IF your experience lightheadedness, dizziness, or fainting spell.  If you are unable to reach your doctor, you may contact Cardiology Office at [facility] at [phone]  IF you experience a SINGLE SHOCK and feel okay please call EP clinic   IF your experience MULTIPLE SHOCKS or single shock and you are NOT FEELING good, call [phone] or have someone take you to nearest ER      Follow heart healthy diet recommended by your doctor, , if you smoke STOP SMOKING ( may call [phone] for center of tobacco control if you need assistance)

## 2023-07-21 NOTE — DISCHARGE NOTE PROVIDER - NSDCFUADDAPPT_GEN_ALL_CORE_FT
Please call Dr. Rueda's office on Monday to schedule an appt post procedure  Follow up with Dr. Guerrero  Please call Dr. Burciaga's office for post procedure follow up appointment

## 2023-07-21 NOTE — DISCHARGE NOTE PROVIDER - NSDCFUSCHEDAPPT_GEN_ALL_CORE_FT
Michel Guerrero  [facility]  [address]  Scheduled Appointment: 08/18/2023

## 2023-07-21 NOTE — DISCHARGE NOTE PROVIDER - CARE PROVIDERS DIRECT ADDRESSES
[email or web address]

## 2023-07-21 NOTE — PRE-ANESTHESIA EVALUATION ADULT - NSPROPOSEDPROCEDFT_GEN_ALL_CORE
Subjective    Patient seen and examined with mom at bedside. Patient writes that he thinks he passed the ureteral stones with void, notes small gravel in urine strainer. Has not had flank pain since yesterday. Denies fevers. PA reports some nausea with restarting feeds.     Objective    Vital signs  T(F): , Max: 99.4 (12-04-22 @ 11:00)  HR: 118 (12-05-22 @ 06:44)  BP: 128/85 (12-05-22 @ 05:00)  SpO2: 100% (12-05-22 @ 06:44)  Wt(kg): --    Output     12-04 @ 07:01  -  12-05 @ 07:00  --------------------------------------------------------  IN: 3551.6 mL / OUT: 2150 mL / NET: 1401.6 mL        General: NAD  Abdomen: soft/non-distended  : no CVA tenderness b/l, mild LLQ tenderness. Urine strainer with small gravel    Labs      12-05 @ 03:18    WBC 16.74 / Hct 26.3  / SCr 1.01     12-04 @ 22:05    WBC --    / Hct --    / SCr 1.06       Urine culture pending.     Imaging  < from: CT Abdomen and Pelvis w/ IV Cont (12.04.22 @ 05:40) >  IMPRESSION:  New right moderate to severe hydroureteronephrosis with delayed right   nephrogram and likely tiny/small stones in the distal right ureter.    Associated right sided intraperitoneal free fluid and retroperitoneal   fluid.    Ureteritis.    < end of copied text >   ICD explant/reimplant

## 2023-07-21 NOTE — DISCHARGE NOTE PROVIDER - NSDCCPTREATMENT_GEN_ALL_CORE_FT
PRINCIPAL PROCEDURE  Procedure: AICD lead extraction  Findings and Treatment: Study Date:     07/21/2023   Electrophysiologist:           Marina Burciaga MD   Referring Physician:           Michel Guerrero MD   Indications   Fractured ICD lead   Complete Heart Block   Hypertrophic obstructive cardiomyopathy   Primary Procedures:      ICD/Leadextraction   Secondary Procedure:     Dual chamber ICD implant   Conclusions   Successful ICD/Lead extraction,   Successfull implantation of dual chamber ICD   Complications   No complications

## 2023-07-21 NOTE — DISCHARGE NOTE PROVIDER - NSDCCPCAREPLAN_GEN_ALL_CORE_FT
PRINCIPAL DISCHARGE DIAGNOSIS  Diagnosis: HOCM (hypertrophic obstructive cardiomyopathy)  Assessment and Plan of Treatment: s/p ICD lead extraction and dual chamber ICD implant  Your heart rate will be controlled.  Continue with follow-up visits to your electrophysiology team and with your home remote device monitoring (if applicable). Continue your medications as prescribed.      SECONDARY DISCHARGE DIAGNOSES  Diagnosis: HTN (hypertension)  Assessment and Plan of Treatment: Your blood pressure will be controlled.  Continue with your blood pressure medications; eat a heart healthy diet with low salt diet; exercise regularly (consult with your physician or cardiologist first); maintain a heart healthy weight; if you smoke - quit (A resource to help you stop smoking is the [facility] – phone number [phone].); include healthy ways to manage stress. Continue to follow with your primary care physician or cardiologist.

## 2023-07-21 NOTE — DISCHARGE NOTE PROVIDER - HOSPITAL COURSE
HPI: 73 y/o female, PMHx of anxiety, pulmonary HTN, pemphigus foliaceus, Defib, epigastric hernia, HOCM, heart block, St. Judes ppm for 11 years, tricuspid regurgitation, palpitations, presented to [facility] on 7/19/23 after a syncopal episode. Pt was walking outside, felt she was going to pass out, lowered herself and then fell over to the side on the grass.    The patient's device was found to have a lead fracture with noise. It was reprogrammed to DOO and the patient was transferred for consideration for extraction with reimplant    7/21 s/p lead extraction and replacement via LACW, B/L groin sites- sites without hematoma/bleeding. Patient without complaint, hemodynamically stable.

## 2023-07-21 NOTE — DISCHARGE NOTE PROVIDER - NSDCMRMEDTOKEN_GEN_ALL_CORE_FT
atenolol 25 mg oral tablet: 12.5 milligram(s) orally once a day  Low Dose ASA 81 mg oral tablet: 1 tab(s) orally once a day   acetaminophen 325 mg oral tablet: 2 orally every 6 hours as needed for mild pain  atenolol 25 mg oral tablet: 12.5 milligram(s) orally once a day  Low Dose ASA 81 mg oral tablet: 1 tab(s) orally once a day

## 2023-07-21 NOTE — DISCHARGE NOTE PROVIDER - PROVIDER TOKENS
PROVIDER:[TOKEN:[[id number]],SCHEDULEDAPPT:[08/18/2023],SCHEDULEDAPPTTIME:[10:30 AM]],PROVIDER:[TOKEN:[[id number]],FOLLOWUP:[1-3 days]],PROVIDER:[TOKEN:[[id number]],FOLLOWUP:[2 weeks]]

## 2023-07-21 NOTE — DISCHARGE NOTE PROVIDER - CARE PROVIDER_API CALL
Michel Guerrero.  Cardiovascular Disease  [address] [place] [address]  Phone: [phone]  Fax: [phone]  Scheduled Appointment: 08/18/2023 10:30 AM    Bill Rueda  Cardiology  [address], [address] [place] [id number]  Phone: [phone]  Fax: [phone]  Follow Up Time: 1-3 days    Marina Burciaga  Cardiovascular Disease  [address], [address] [place] [address]  Phone: [phone]  Fax: [phone]  Follow Up Time: 2 weeks

## 2023-07-22 ENCOUNTER — TRANSCRIPTION ENCOUNTER (OUTPATIENT)
Age: 73
End: 2023-07-22

## 2023-07-22 VITALS
OXYGEN SATURATION: 99 % | RESPIRATION RATE: 18 BRPM | SYSTOLIC BLOOD PRESSURE: 129 MMHG | DIASTOLIC BLOOD PRESSURE: 74 MMHG | TEMPERATURE: 98 F | HEART RATE: 67 BPM

## 2023-07-22 LAB
ANION GAP SERPL CALC-SCNC: 14 MMOL/L — SIGNIFICANT CHANGE UP (ref 5–17)
BASOPHILS # BLD AUTO: 0.01 K/UL — SIGNIFICANT CHANGE UP (ref 0–0.2)
BASOPHILS NFR BLD AUTO: 0.1 % — SIGNIFICANT CHANGE UP (ref 0–2)
BUN SERPL-MCNC: 15 MG/DL — SIGNIFICANT CHANGE UP (ref 7–23)
CALCIUM SERPL-MCNC: 8.5 MG/DL — SIGNIFICANT CHANGE UP (ref 8.4–10.5)
CHLORIDE SERPL-SCNC: 98 MMOL/L — SIGNIFICANT CHANGE UP (ref 96–108)
CO2 SERPL-SCNC: 22 MMOL/L — SIGNIFICANT CHANGE UP (ref 22–31)
CREAT SERPL-MCNC: 0.66 MG/DL — SIGNIFICANT CHANGE UP (ref 0.5–1.3)
EGFR: 94 ML/MIN/1.73M2 — SIGNIFICANT CHANGE UP
EOSINOPHIL # BLD AUTO: 0 K/UL — SIGNIFICANT CHANGE UP (ref 0–0.5)
EOSINOPHIL NFR BLD AUTO: 0 % — SIGNIFICANT CHANGE UP (ref 0–6)
GLUCOSE SERPL-MCNC: 272 MG/DL — HIGH (ref 70–99)
HCT VFR BLD CALC: 38.9 % — SIGNIFICANT CHANGE UP (ref 34.5–45)
HGB BLD-MCNC: 12.9 G/DL — SIGNIFICANT CHANGE UP (ref 11.5–15.5)
IMM GRANULOCYTES NFR BLD AUTO: 0.4 % — SIGNIFICANT CHANGE UP (ref 0–0.9)
LYMPHOCYTES # BLD AUTO: 0.61 K/UL — LOW (ref 1–3.3)
LYMPHOCYTES # BLD AUTO: 6.2 % — LOW (ref 13–44)
MAGNESIUM SERPL-MCNC: 2 MG/DL — SIGNIFICANT CHANGE UP (ref 1.6–2.6)
MCHC RBC-ENTMCNC: 30.8 PG — SIGNIFICANT CHANGE UP (ref 27–34)
MCHC RBC-ENTMCNC: 33.2 GM/DL — SIGNIFICANT CHANGE UP (ref 32–36)
MCV RBC AUTO: 92.8 FL — SIGNIFICANT CHANGE UP (ref 80–100)
MONOCYTES # BLD AUTO: 0.53 K/UL — SIGNIFICANT CHANGE UP (ref 0–0.9)
MONOCYTES NFR BLD AUTO: 5.4 % — SIGNIFICANT CHANGE UP (ref 2–14)
NEUTROPHILS # BLD AUTO: 8.68 K/UL — HIGH (ref 1.8–7.4)
NEUTROPHILS NFR BLD AUTO: 87.9 % — HIGH (ref 43–77)
NRBC # BLD: 0 /100 WBCS — SIGNIFICANT CHANGE UP (ref 0–0)
PLATELET # BLD AUTO: 161 K/UL — SIGNIFICANT CHANGE UP (ref 150–400)
POTASSIUM SERPL-MCNC: 4.1 MMOL/L — SIGNIFICANT CHANGE UP (ref 3.5–5.3)
POTASSIUM SERPL-SCNC: 4.1 MMOL/L — SIGNIFICANT CHANGE UP (ref 3.5–5.3)
RBC # BLD: 4.19 M/UL — SIGNIFICANT CHANGE UP (ref 3.8–5.2)
RBC # FLD: 12.9 % — SIGNIFICANT CHANGE UP (ref 10.3–14.5)
SODIUM SERPL-SCNC: 134 MMOL/L — LOW (ref 135–145)
WBC # BLD: 9.87 K/UL — SIGNIFICANT CHANGE UP (ref 3.8–10.5)
WBC # FLD AUTO: 9.87 K/UL — SIGNIFICANT CHANGE UP (ref 3.8–10.5)

## 2023-07-22 PROCEDURE — C1721: CPT

## 2023-07-22 PROCEDURE — C1777: CPT

## 2023-07-22 PROCEDURE — C1773: CPT

## 2023-07-22 PROCEDURE — C9399: CPT

## 2023-07-22 PROCEDURE — 93005 ELECTROCARDIOGRAM TRACING: CPT

## 2023-07-22 PROCEDURE — 36415 COLL VENOUS BLD VENIPUNCTURE: CPT

## 2023-07-22 PROCEDURE — 86901 BLOOD TYPING SEROLOGIC RH(D): CPT

## 2023-07-22 PROCEDURE — 76000 FLUOROSCOPY <1 HR PHYS/QHP: CPT

## 2023-07-22 PROCEDURE — C1730: CPT

## 2023-07-22 PROCEDURE — C1898: CPT

## 2023-07-22 PROCEDURE — 80048 BASIC METABOLIC PNL TOTAL CA: CPT

## 2023-07-22 PROCEDURE — 85025 COMPLETE CBC W/AUTO DIFF WBC: CPT

## 2023-07-22 PROCEDURE — 86850 RBC ANTIBODY SCREEN: CPT

## 2023-07-22 PROCEDURE — C1894: CPT

## 2023-07-22 PROCEDURE — 71045 X-RAY EXAM CHEST 1 VIEW: CPT

## 2023-07-22 PROCEDURE — 86900 BLOOD TYPING SEROLOGIC ABO: CPT

## 2023-07-22 PROCEDURE — C1892: CPT

## 2023-07-22 PROCEDURE — 93010 ELECTROCARDIOGRAM REPORT: CPT

## 2023-07-22 PROCEDURE — 71046 X-RAY EXAM CHEST 2 VIEWS: CPT | Mod: 26

## 2023-07-22 PROCEDURE — 71046 X-RAY EXAM CHEST 2 VIEWS: CPT

## 2023-07-22 PROCEDURE — 99232 SBSQ HOSP IP/OBS MODERATE 35: CPT | Mod: FS

## 2023-07-22 PROCEDURE — C1889: CPT

## 2023-07-22 PROCEDURE — 83735 ASSAY OF MAGNESIUM: CPT

## 2023-07-22 PROCEDURE — C2629: CPT

## 2023-07-22 PROCEDURE — 86923 COMPATIBILITY TEST ELECTRIC: CPT

## 2023-07-22 PROCEDURE — C1769: CPT

## 2023-07-22 RX ORDER — ACETAMINOPHEN 500 MG
2 TABLET ORAL
Qty: 0 | Refills: 0 | DISCHARGE

## 2023-07-22 RX ADMIN — ATENOLOL 12.5 MILLIGRAM(S): 25 TABLET ORAL at 05:32

## 2023-07-22 RX ADMIN — Medication 81 MILLIGRAM(S): at 05:32

## 2023-07-22 NOTE — DISCHARGE NOTE NURSING/CASE MANAGEMENT/SOCIAL WORK - NSDCPEFALRISK_GEN_ALL_CORE
For information on Fall & Injury Prevention, visit: [email or web address] OR  [email or web address] OR  https://www.cdc.gov/steadi/patient.html

## 2023-07-22 NOTE — DISCHARGE NOTE NURSING/CASE MANAGEMENT/SOCIAL WORK - PATIENT PORTAL LINK FT
You can access the FollowMyHealth Patient Portal offered by [facility] by registering at the following website: [email or web address]. By joining [employer]’s FollowMyHealth portal, you will also be able to view your health information using other applications (apps) compatible with our system.

## 2023-07-22 NOTE — PROGRESS NOTE ADULT - SUBJECTIVE AND OBJECTIVE BOX
[facility] ELECTROPHYSIOLOGY  [phone]    CHIEF COMPLAINT: Patient is a 72y old  Female who presents with a chief complaint of syncope    HPI: 73 y/o female, PMHx of anxiety, pulmonary HTN, pemphigus foliaceus, Defib, epigastric hernia, HOCM, heart block, St. Judes ppm for 11 years, tricuspid regurgitation, palpitations, presented to [facility] on 7/19/23 after a syncopal episode. Pt was walking outside, felt she was going to pass out, lowered herself and then fell over to the side on the grass.  The patient's device was found to have a lead fracture with noise. It was reprogrammed to DOO and the patient was transferred for consideration for extraction with reimplant (19 Jul 2023 19:21)    Subjective/Observations: patient seen and examined.  denies chest pain, dyspena, dizziness, palpitations, N&V, HA      Review of Systems all WNL except below indicated:    Constitutional: [ ] Fever [ ] Chills [ ] Fatigue [ ] Weight change   HEENT: [ ] Blurred vision [ ] Eye Pain [ ] Headache [ ] Runny nose [ ] Sore Throat   Respiratory: [ ] Cough [ ] Wheezing [ ] Shortness of breath  Cardiovascular: [ ] Chest Pain [ ] Palpitations [ ] MCLEAN [ ] PND [ ] Orthopnea  Gastrointestinal: [ ] Abdominal Pain [ ] Diarrhea [ ] Constipation [ ] Hemorrhoids [ ] Nausea [ ] Vomiting  Genitourinary: [ ] Nocturia [ ] Dysuria [ ] Incontinence  Extremities: [ ] Swelling [ ] Joint Pain  Neurologic: [ ] Focal deficit [ ] Paresthesias [ ] Syncope  Lymphatic: [ ] Swelling [ ] Lymphadenopathy   Skin: [ ] Rash [ ] Ecchymoses [ ] Wounds [ ] Lesions  Psychiatry: [ ] Depression [ ] Suicidal/Homicidal Ideation [ ] Anxiety [ ] Sleep Disturbances  [ ] 10 point review of systems is otherwise negative except as mentioned above            [ ]Unable to obtain      PAST MEDICAL & SURGICAL HISTORY:  Cardiomyopathy, hypertrophic obstructive    Complete heart block    Tricuspid regurgitation    Epigastric hernia    Pemphigus foliaceus    Pulmonary hypertension, mild  ECHO 2018    Anxiety    AICD (automatic cardioverter/defibrillator) present  left chest 11/2011      MEDICATIONS  (STANDING):  aspirin enteric coated 81 milliGRAM(s) Oral daily  atenolol  Tablet 12.5 milliGRAM(s) Oral daily  benzocaine/menthol Lozenge 1 Lozenge Oral once    MEDICATIONS  (PRN):    Allergies    No Known Allergies      Vital Signs Last 24 Hrs  T(C): 36.6 (21 Jul 2023 21:06), Max: 36.7 (21 Jul 2023 04:40)  T(F): 97.8 (21 Jul 2023 21:06), Max: 98.1 (21 Jul 2023 08:04)  HR: 63 (21 Jul 2023 23:45) (63 - 80)  BP: 117/58 (21 Jul 2023 23:45) (117/58 - 157/70)  BP(mean): 75 (21 Jul 2023 23:45) (75 - 105)  RR: 18 (21 Jul 2023 23:45) (15 - 18)  SpO2: 97% (21 Jul 2023 23:45) (95% - 100%)    Parameters below as of 21 Jul 2023 20:45  Patient On (Oxygen Delivery Method): room air    I&O's Summary    20 Jul 2023 07:01  -  21 Jul 2023 07:00  --------------------------------------------------------  IN: 240 mL / OUT: 0 mL / NET: 240 mL    21 Jul 2023 07:01  -  22 Jul 2023 01:41  --------------------------------------------------------  IN: 0 mL / OUT: 0 mL / NET: 0 mL    FOCUSED PHYSICAL EXAM:  Neuro: No apparent distress, alert and oriented times x3, appropriate affect  Pulmonary: Non-labored, breath sounds are clear bilaterally, No wheezing, rales or rhonchi  Cardiovascular: Regular, S1 and S2, No murmurs, rubs, gallops or clicks  Site: Left anterior chest wall:  Soft, non tender, no bleeding or hematoma, no crepitus, dressing clean/dry/intact  B/L groin: Soft, non tender, no bleeding or hematoma. Pulses in the right lower extremity are palpable    LABS: All Labs Reviewed:                        15.1   5.96  )-----------( 192      ( 19 Jul 2023 06:51 )             44.7     19 Jul 2023 06:51    142    |  111    |  10     ----------------------------<  86     4.3     |  27     |  0.70     Ca    9.2        19 Jul 2023 06:51      RESULTS:     TELE interpretation: AV Paced 60s    ECG:   Ventricular Rate 80 BPM    Atrial Rate 80 BPM    P-R Interval 222 ms    QRS Duration 148 ms    Q-T Interval 432 ms    QTC Calculation(Bazett) 498 ms    P Axis 72 degrees    R Axis -37 degrees    T Axis 126 degrees    Diagnosis Line AV dual-paced rhythm with prolonged AV conduction  ABNORMAL ECG  NO PREVIOUS ECGS AVAILABLE    ECHO: 7/18/23   Summary   The left ventricle is normal in size, wall thickness, wall motion and   contractility.   Estimated left ventricular ejection fraction is 55-60 %.   Mild concentric left ventricular hypertrophy is present.   Normal appearing right ventricle structure and function.   A device wire is seen in the RV and RA.   The mitral valve leaflets appear thickened.   Mild mitral annular calcification is present.   Moderate to severe (3+) mitral regurgitation is present.   Normal appearing tricuspid valve structure.   Moderate to severe (3+) tricuspid valve regurgitation is present.   Mild pulmonary hypertension.  
HPI:  73 y/o female, PMHx of anxiety, pulmonary HTN, pemphigus foliaceus, Defib, epigastric hernia, HOCM, heart block, St. Judes ppm for 11 years, tricuspid regurgitation, palpitations, presented to [facility] on 7/19/23 after a syncopal episode. Pt was walking outside, felt she was going to pass out, lowered herself and then fell over to the side on the grass.    The patient's device was found to have a lead fracture with noise. It was reprogrammed to DOO and the patient was transferred for consideration for extraction with reimplant (19 Jul 2023 19:21)    Patient is a 72y old  Female who presents with a chief complaint of         Allergies    No Known Allergies    Intolerances        Medications:  acetaminophen     Tablet .. 650 milliGRAM(s) Oral every 6 hours PRN  aspirin enteric coated 81 milliGRAM(s) Oral daily  atenolol  Tablet 12.5 milliGRAM(s) Oral daily      Vitals:  T(C): 36.8 (07-20-23 @ 20:42), Max: 36.8 (07-20-23 @ 16:15)  HR: 80 (07-20-23 @ 20:42) (80 - 80)  BP: 150/79 (07-20-23 @ 20:42) (144/88 - 150/79)  BP(mean): 99 (07-20-23 @ 20:42) (99 - 105)  RR: 17 (07-20-23 @ 20:42) (17 - 17)  SpO2: 99% (07-20-23 @ 20:42) (99% - 100%)  Wt(kg): --  Daily     Daily   I&O's Summary    20 Jul 2023 07:01  -  21 Jul 2023 01:45  --------------------------------------------------------  IN: 240 mL / OUT: 0 mL / NET: 240 mL          Physical Exam:  Appearance: Normal  Eyes: PERRL, EOMI  HENT: Normal oral muscosa, NC/AT  Cardiovascular: S1S2, RRR, No M/R/G, no JVD, No Lower extremity edema  Procedural Access Site: No hematoma, Non-tender to palpation, 2+ pulse, No bruit, No Ecchymosis  Respiratory: Clear to auscultation bilaterally  Gastrointestinal: Soft, Non tender, Normal Bowel Sounds  Musculoskeletal: No clubbing, No joint deformity   Neurologic: Non-focal  Lymphatic: No lymphadenopathy  Psychiatry: AAOx3, Mood & affect appropriate  Skin: No rashes, No ecchymoses, No cyanosis    07-19    142  |  111<H>  |  10  ----------------------------<  86  4.3   |  27  |  0.70    Ca    9.2      19 Jul 2023 06:51              Lipid panel   Hgb A1c                         15.1   5.96  )-----------( 192      ( 19 Jul 2023 06:51 )             44.7         ECG: A-paced 80 bpm  
HPI:  71 y/o female, PMHx of anxiety, pulmonary HTN, pemphigus foliaceus, Defib, epigastric hernia, HOCM, heart block, St. Judes ppm for 11 years, tricuspid regurgitation, palpitations, presented to [facility] on 23 after a syncopal episode. Pt was walking outside, felt she was going to pass out, lowered herself and then fell over to the side on the grass. (2023 19:21)    Patient is a 72y old  Female who presents with a chief complaint of         Allergies    No Known Allergies    Intolerances        Medications:  acetaminophen     Tablet .. 650 milliGRAM(s) Oral every 6 hours PRN  atenolol  Tablet 12.5 milliGRAM(s) Oral daily      Vitals:  T(C): 36.4 (23 @ 01:06), Max: 36.7 (23 @ 07:43)  HR: 80 (23 @ 01:06) (79 - 80)  BP: 143/92 (23 @ 01:06) (133/85 - 157/82)  BP(mean): 107 (23 @ 01:06) (105 - 107)  RR: 17 (23 @ 01:06) (17 - 18)  SpO2: 99% (23 @ 01:06) (95% - 99%)  Wt(kg): --  Daily Height in cm: 162.56 (2023 19:21)    Daily Weight in k.9 (2023 19:21)  I&O's Summary        Physical Exam:  Appearance: Normal  Eyes: PERRL, EOMI  HENT: Normal oral muscosa, NC/AT  Cardiovascular: S1S2, RRR, No M/R/G, no JVD, No Lower extremity edema  Procedural Access Site: No hematoma, Non-tender to palpation, 2+ pulse, No bruit, No Ecchymosis  Respiratory: Clear to auscultation bilaterally  Gastrointestinal: Soft, Non tender, Normal Bowel Sounds  Musculoskeletal: No clubbing, No joint deformity   Neurologic: Non-focal  Lymphatic: No lymphadenopathy  Psychiatry: AAOx3, Mood & affect appropriate  Skin: No rashes, No ecchymoses, No cyanosis    -    142  |  111<H>  |  10  ----------------------------<  86  4.3   |  27  |  0.70    Ca    9.2      2023 06:51              Lipid panel Total Cholesterol: 212  LDL: --  HDL: 119  T      Hgb A1c                         15.1   5.96  )-----------( 192      ( 2023 06:51 )             44.7         ECG: AV paced 80 bpm

## 2023-07-22 NOTE — PROGRESS NOTE ADULT - ASSESSMENT
HPI: 71 y/o female, PMHx of anxiety, pulmonary HTN, pemphigus foliaceus, Defib, epigastric hernia, HOCM, heart block, [facility] ppm for 11 years, tricuspid regurgitation, palpitations, presented to [facility] on 7/19/23 after a syncopal episode. Pt was walking outside, felt she was going to pass out, lowered herself and then fell over to the side on the grass.  The patient's device was found to have a lead fracture with noise. It was reprogrammed to DOO and the patient was transferred for consideration for extraction with reimplant    # Lead extraction and dual chamber ICD reimplant  7/21 s/p lead extraction and dual chamber ICD reimplant via LACW, B/L groin  Cont atenolol  Continue monitoring telemetry  F/U cxray post op offical read to r/o pneumothorax and check lead tip placement  Keep Potassium> 4.0 and Magnesium>2.0  Pain management protocol- patient only wants tylenol for pain  ICD/ PPM card, booklet, and discharge instruction given to patient  Follow up w/ post procedure EP appt/dispo F/U in EP clinic in 7-10 days for wound check appointment  Follow up w/ Dr. uReda  Rep to see patient in AM for device  Anticipate discharge home if telemetry stable with stable vitals and labs, if site and condition remain stable      Beni Chavez [facility]  Ext [phone]  
73 y/o female with above pmhx, awaiting RV lead extraction on Friday 7/21.
71 y/o female with above pmhx, NPO for ICD lead extraction and lead reimplant on 7/21.

## 2023-07-26 DIAGNOSIS — F41.9 ANXIETY DISORDER, UNSPECIFIED: ICD-10-CM

## 2023-07-26 DIAGNOSIS — Z79.82 LONG TERM (CURRENT) USE OF ASPIRIN: ICD-10-CM

## 2023-07-26 DIAGNOSIS — I42.1 OBSTRUCTIVE HYPERTROPHIC CARDIOMYOPATHY: ICD-10-CM

## 2023-07-26 DIAGNOSIS — Z95.810 PRESENCE OF AUTOMATIC (IMPLANTABLE) CARDIAC DEFIBRILLATOR: ICD-10-CM

## 2023-07-26 DIAGNOSIS — R55 SYNCOPE AND COLLAPSE: ICD-10-CM

## 2023-07-26 DIAGNOSIS — I27.20 PULMONARY HYPERTENSION, UNSPECIFIED: ICD-10-CM

## 2023-07-26 DIAGNOSIS — R00.1 BRADYCARDIA, UNSPECIFIED: ICD-10-CM

## 2023-08-10 ENCOUNTER — NON-APPOINTMENT (OUTPATIENT)
Age: 73
End: 2023-08-10

## 2023-08-10 ENCOUNTER — APPOINTMENT (OUTPATIENT)
Dept: ELECTROPHYSIOLOGY | Facility: CLINIC | Age: 73
End: 2023-08-10

## 2023-08-10 ENCOUNTER — APPOINTMENT (OUTPATIENT)
Dept: ELECTROPHYSIOLOGY | Facility: CLINIC | Age: 73
End: 2023-08-10
Payer: MEDICARE

## 2023-08-10 VITALS
BODY MASS INDEX: 18.78 KG/M2 | SYSTOLIC BLOOD PRESSURE: 181 MMHG | WEIGHT: 110 LBS | HEIGHT: 64 IN | OXYGEN SATURATION: 98 % | HEART RATE: 77 BPM | DIASTOLIC BLOOD PRESSURE: 88 MMHG

## 2023-08-10 PROCEDURE — 99212 OFFICE O/P EST SF 10 MIN: CPT | Mod: NC

## 2023-08-23 ENCOUNTER — APPOINTMENT (OUTPATIENT)
Dept: ELECTROPHYSIOLOGY | Facility: CLINIC | Age: 73
End: 2023-08-23

## 2023-10-17 ENCOUNTER — APPOINTMENT (OUTPATIENT)
Dept: ELECTROPHYSIOLOGY | Facility: CLINIC | Age: 73
End: 2023-10-17

## 2023-11-09 ENCOUNTER — APPOINTMENT (OUTPATIENT)
Dept: ELECTROPHYSIOLOGY | Facility: CLINIC | Age: 73
End: 2023-11-09
Payer: MEDICARE

## 2023-11-09 ENCOUNTER — NON-APPOINTMENT (OUTPATIENT)
Age: 73
End: 2023-11-09

## 2023-11-09 PROCEDURE — 93294 REM INTERROG EVL PM/LDLS PM: CPT

## 2023-11-09 PROCEDURE — 93296 REM INTERROG EVL PM/IDS: CPT

## 2023-11-13 ENCOUNTER — RX RENEWAL (OUTPATIENT)
Age: 73
End: 2023-11-13

## 2023-11-17 ENCOUNTER — NON-APPOINTMENT (OUTPATIENT)
Age: 73
End: 2023-11-17

## 2024-02-16 RX ORDER — ATENOLOL 25 MG/1
25 TABLET ORAL
Qty: 90 | Refills: 2 | Status: ACTIVE | COMMUNITY
Start: 2017-10-13 | End: 1900-01-01

## 2024-04-23 ENCOUNTER — NON-APPOINTMENT (OUTPATIENT)
Age: 74
End: 2024-04-23

## 2024-04-23 ENCOUNTER — APPOINTMENT (OUTPATIENT)
Dept: ELECTROPHYSIOLOGY | Facility: CLINIC | Age: 74
End: 2024-04-23
Payer: MEDICARE

## 2024-04-24 PROCEDURE — 93296 REM INTERROG EVL PM/IDS: CPT

## 2024-04-24 PROCEDURE — 93295 DEV INTERROG REMOTE 1/2/MLT: CPT

## 2024-09-30 ENCOUNTER — NON-APPOINTMENT (OUTPATIENT)
Age: 74
End: 2024-09-30

## 2024-09-30 ENCOUNTER — APPOINTMENT (OUTPATIENT)
Dept: ELECTROPHYSIOLOGY | Facility: CLINIC | Age: 74
End: 2024-09-30
Payer: MEDICARE

## 2024-10-01 PROCEDURE — 93296 REM INTERROG EVL PM/IDS: CPT

## 2024-10-01 PROCEDURE — 93295 DEV INTERROG REMOTE 1/2/MLT: CPT

## 2024-12-31 ENCOUNTER — NON-APPOINTMENT (OUTPATIENT)
Age: 74
End: 2024-12-31

## 2024-12-31 ENCOUNTER — APPOINTMENT (OUTPATIENT)
Dept: ELECTROPHYSIOLOGY | Facility: CLINIC | Age: 74
End: 2024-12-31
Payer: MEDICARE

## 2024-12-31 PROCEDURE — 93296 REM INTERROG EVL PM/IDS: CPT

## 2024-12-31 PROCEDURE — 93295 DEV INTERROG REMOTE 1/2/MLT: CPT

## 2025-02-06 ENCOUNTER — APPOINTMENT (OUTPATIENT)
Dept: ELECTROPHYSIOLOGY | Facility: CLINIC | Age: 75
End: 2025-02-06

## 2025-03-04 ENCOUNTER — RX RENEWAL (OUTPATIENT)
Age: 75
End: 2025-03-04

## 2025-03-20 ENCOUNTER — APPOINTMENT (OUTPATIENT)
Dept: ELECTROPHYSIOLOGY | Facility: CLINIC | Age: 75
End: 2025-03-20

## 2025-05-08 ENCOUNTER — NON-APPOINTMENT (OUTPATIENT)
Age: 75
End: 2025-05-08

## 2025-05-08 ENCOUNTER — APPOINTMENT (OUTPATIENT)
Dept: ELECTROPHYSIOLOGY | Facility: CLINIC | Age: 75
End: 2025-05-08
Payer: MEDICARE

## 2025-05-08 PROCEDURE — 93296 REM INTERROG EVL PM/IDS: CPT

## 2025-05-08 PROCEDURE — 93295 DEV INTERROG REMOTE 1/2/MLT: CPT

## 2025-08-07 ENCOUNTER — NON-APPOINTMENT (OUTPATIENT)
Age: 75
End: 2025-08-07

## 2025-08-07 ENCOUNTER — APPOINTMENT (OUTPATIENT)
Dept: ELECTROPHYSIOLOGY | Facility: CLINIC | Age: 75
End: 2025-08-07
Payer: MEDICARE

## 2025-08-07 PROCEDURE — 93295 DEV INTERROG REMOTE 1/2/MLT: CPT

## 2025-08-07 PROCEDURE — 93296 REM INTERROG EVL PM/IDS: CPT

## (undated) DEVICE — SOL IRR POUR H2O 250ML

## (undated) DEVICE — WARMING BLANKET FULL UNDERBODY

## (undated) DEVICE — GLV 7 PROTEXIS (WHITE)

## (undated) DEVICE — SOL IRR POUR NS 0.9% 500ML

## (undated) DEVICE — POSITIONER FOAM EGG CRATE ULNAR 2PCS (PINK)

## (undated) DEVICE — VENODYNE/SCD SLEEVE CALF LARGE

## (undated) DEVICE — SPECIMEN CONTAINER 100ML

## (undated) DEVICE — WARMING BLANKET DUO-THERM HYPER/HYPOTHERM ADULT

## (undated) DEVICE — DRSG OPSITE 13.75 X 4"

## (undated) DEVICE — DRAPE 1/2 SHEET 40X57"

## (undated) DEVICE — DRAPE IOBAN 23" X 23"

## (undated) DEVICE — BLADE SCALPEL SAFETYLOCK #15

## (undated) DEVICE — GLV 7.5 PROTEXIS (WHITE)

## (undated) DEVICE — GLV 8 PROTEXIS (WHITE)

## (undated) DEVICE — STAPLER SKIN VISI-STAT 35 WIDE

## (undated) DEVICE — BLADE SCALPEL SAFETYLOCK #11

## (undated) DEVICE — DRAPE INSTRUMENT POUCH 6.75" X 11"

## (undated) DEVICE — SUCTION YANKAUER NO CONTROL VENT

## (undated) DEVICE — SYR ASEPTO

## (undated) DEVICE — STEALTH CLAMP INSERT FIBRA/FIBRA 90MM

## (undated) DEVICE — CABLE SUPREME QUADRIPOLAR (BLACK)

## (undated) DEVICE — SUT SOFSILK 0 18" TIES

## (undated) DEVICE — SUT STAINLESS STEEL 5 18" SCC

## (undated) DEVICE — DRAPE MAYO STAND 30"

## (undated) DEVICE — DEFIBRILLATOR PAD PRE-CONNECT ADULT/CHILD

## (undated) DEVICE — SUT SOFSILK 2-0 18" TIES

## (undated) DEVICE — NDL HYPO SAFE 25G X 5/8" (ORANGE)

## (undated) DEVICE — SUT BIOSYN 4-0 18" P-12

## (undated) DEVICE — DRSG DERMABOND PRINEO 60CM

## (undated) DEVICE — DRAPE TOWEL BLUE 17" X 24"

## (undated) DEVICE — SUT ETHIBOND 0 44" EN3

## (undated) DEVICE — SUT SOFSILK 0 30" V-20

## (undated) DEVICE — SUT DOUBLE 6 WIRE STERNAL

## (undated) DEVICE — SYR LUER LOK 10CC

## (undated) DEVICE — SUT SURGICAL STEEL 6 30" BP-1

## (undated) DEVICE — GLV 6.5 PROTEXIS (WHITE)

## (undated) DEVICE — SUT POLYSORB 0 36" GS-25 UNDYED

## (undated) DEVICE — SUT BLUNT SZ 5

## (undated) DEVICE — NDL HYPO REGULAR BEVEL 22G X 1.5" (TURQUOISE)

## (undated) DEVICE — GLV 8.5 PROTEXIS (WHITE)

## (undated) DEVICE — SOL NORMOSOL-R PH7.4 1000ML

## (undated) DEVICE — VISITEC 4X4

## (undated) DEVICE — PACK UNIVERSAL CARDIAC

## (undated) DEVICE — LAP PAD 18 X 18"

## (undated) DEVICE — SUT PLEDGET PRE PUNCH 4.8 X 9.5 X 1.5 MM

## (undated) DEVICE — TUBING BRAT 2 SUCTION ASSEMBLY TWIST LOCK

## (undated) DEVICE — FOLEY TRAY 16FR 5CC LF LUBRISIL ADVANCE TEMP CLOSED

## (undated) DEVICE — DRAPE 3/4 SHEET W REINFORCEMENT 56X77"

## (undated) DEVICE — PREP CHLORAPREP HI-LITE ORANGE 26ML

## (undated) DEVICE — SAW BLADE MICROAIRE STERNUM 1X34X9.4MM

## (undated) DEVICE — BLADE SCALPEL SAFETYLOCK #10

## (undated) DEVICE — MEDICATION LABELS W MARKER

## (undated) DEVICE — STEALTH CLAMP INSERT FIBRA/FIBRA 60MM

## (undated) DEVICE — SUT POLYSORB 2-0 30" GS-21 UNDYED

## (undated) DEVICE — DRAPE C ARM UNIVERSAL